# Patient Record
Sex: MALE | Race: WHITE | NOT HISPANIC OR LATINO | Employment: FULL TIME | URBAN - METROPOLITAN AREA
[De-identification: names, ages, dates, MRNs, and addresses within clinical notes are randomized per-mention and may not be internally consistent; named-entity substitution may affect disease eponyms.]

---

## 2017-10-19 ENCOUNTER — OFFICE VISIT (OUTPATIENT)
Dept: ENDOCRINOLOGY | Age: 42
End: 2017-10-19

## 2017-10-19 VITALS
OXYGEN SATURATION: 96 % | SYSTOLIC BLOOD PRESSURE: 124 MMHG | DIASTOLIC BLOOD PRESSURE: 76 MMHG | WEIGHT: 186 LBS | HEIGHT: 72 IN | BODY MASS INDEX: 25.19 KG/M2 | HEART RATE: 100 BPM

## 2017-10-19 DIAGNOSIS — Z87.442 PERSONAL HISTORY OF KIDNEY STONES: ICD-10-CM

## 2017-10-19 DIAGNOSIS — E11.9 TYPE 2 DIABETES MELLITUS WITHOUT COMPLICATION, WITH LONG-TERM CURRENT USE OF INSULIN (HCC): Primary | ICD-10-CM

## 2017-10-19 DIAGNOSIS — Z79.4 TYPE 2 DIABETES MELLITUS WITHOUT COMPLICATION, WITH LONG-TERM CURRENT USE OF INSULIN (HCC): Primary | ICD-10-CM

## 2017-10-19 DIAGNOSIS — I10 ESSENTIAL HYPERTENSION: ICD-10-CM

## 2017-10-19 DIAGNOSIS — E78.5 DYSLIPIDEMIA: ICD-10-CM

## 2017-10-19 DIAGNOSIS — E55.9 VITAMIN D DEFICIENCY: ICD-10-CM

## 2017-10-19 PROCEDURE — 99204 OFFICE O/P NEW MOD 45 MIN: CPT | Performed by: INTERNAL MEDICINE

## 2017-10-19 RX ORDER — TAMSULOSIN HYDROCHLORIDE 0.4 MG/1
CAPSULE ORAL
Refills: 11 | COMMUNITY
Start: 2017-09-27 | End: 2020-02-14

## 2017-10-19 RX ORDER — DAPAGLIFLOZIN AND METFORMIN HYDROCHLORIDE 5; 1000 MG/1; MG/1
2 TABLET, FILM COATED, EXTENDED RELEASE ORAL DAILY
Qty: 60 TABLET | Refills: 5 | Status: SHIPPED | OUTPATIENT
Start: 2017-10-19 | End: 2018-03-01 | Stop reason: SDUPTHER

## 2017-10-19 RX ORDER — HYDROCHLOROTHIAZIDE 50 MG/1
TABLET ORAL
Refills: 3 | COMMUNITY
Start: 2017-09-25 | End: 2017-10-19

## 2017-10-19 RX ORDER — INSULIN GLARGINE AND LIXISENATIDE 100; 33 U/ML; UG/ML
60 INJECTION, SOLUTION SUBCUTANEOUS DAILY
Qty: 5 PEN | Refills: 5 | Status: SHIPPED | OUTPATIENT
Start: 2017-10-19 | End: 2018-03-01 | Stop reason: SDUPTHER

## 2017-10-19 RX ORDER — ERGOCALCIFEROL 1.25 MG/1
CAPSULE ORAL
Refills: 5 | COMMUNITY
Start: 2017-09-22 | End: 2018-03-01 | Stop reason: SDUPTHER

## 2017-10-19 RX ORDER — ATORVASTATIN CALCIUM 40 MG/1
40 TABLET, FILM COATED ORAL DAILY
COMMUNITY
End: 2021-04-16 | Stop reason: SDUPTHER

## 2017-10-19 RX ORDER — INSULIN GLARGINE 100 [IU]/ML
INJECTION, SOLUTION SUBCUTANEOUS
Refills: 6 | COMMUNITY
Start: 2017-09-22 | End: 2018-03-01

## 2017-10-19 RX ORDER — LORATADINE 10 MG
TABLET ORAL
Refills: 5 | COMMUNITY
Start: 2017-10-04 | End: 2020-02-14

## 2017-10-19 RX ORDER — METFORMIN HYDROCHLORIDE 500 MG/1
TABLET, EXTENDED RELEASE ORAL
Refills: 0 | COMMUNITY
Start: 2017-09-28 | End: 2017-10-19

## 2017-10-19 RX ORDER — LOSARTAN POTASSIUM 25 MG/1
TABLET ORAL
Refills: 6 | COMMUNITY
Start: 2017-09-27 | End: 2020-02-14

## 2017-10-19 NOTE — PROGRESS NOTES
"Subjective   Gonzalo Mcduffie is a 41 y.o. male NEW PATIENT CONSULT TYPE 2 DIABETES MELLITUS LAB REVIEW  /76  Pulse 100  Ht 72\" (182.9 cm)  Wt 186 lb (84.4 kg)  SpO2 96%  BMI 25.23 kg/m2  Allergies no known allergies    Current Outpatient Prescriptions:   •  atorvastatin (LIPITOR) 40 MG tablet, Take 40 mg by mouth Daily., Disp: , Rfl:   •  [facility]S ASPIRIN ADULT LOW DOSE 81 MG chewable tablet, TAKE 1 TABLET BY MOUTH EVERY DAY, Disp: , Rfl: 5  •  hydrochlorothiazide (HYDRODIURIL) 50 MG tablet, TAKE 1 TABLET BY MOUTH EVERY DAY, Disp: , Rfl: 3  •  LANTUS SOLOSTAR 100 UNIT/ML injection pen, INJECT 28 UNITS SUBCUTANEOUSLY TWICE DAILY, Disp: , Rfl: 6  •  losartan (COZAAR) 25 MG tablet, TAKE 1 TABLET BY MOUTH DAILY, Disp: , Rfl: 6  •  metFORMIN ER (GLUCOPHAGE-XR) 500 MG 24 hr tablet, TAKE 2 TABLETS BY MOUTH TWICE A DAY, Disp: , Rfl: 0  •  tamsulosin (FLOMAX) 0.4 MG capsule 24 hr capsule, TAKE ONE CAPSULE BY MOUTH EVERY DAY, Disp: , Rfl: 11  •  vitamin D (ERGOCALCIFEROL) 78997 units capsule capsule, TAKE ONE CAPSULE BY MOUTH ONCE A WEEK, Disp: , Rfl: 5        History of Present Illness this is a 41-year-old gentleman known patient with type II diabetes for the past 10 years and the he said he started with metformin and and a few other oral agents however after she is a hemoglobin A1c did not calm down now oh he has been for some time on Lantus 28 units twice daily as well as metformin and XR 1000 mg twice daily as well.  He also has a kidney stone and lithotripsy for couple of times.  As far as his family history is concerned his father and siblings have the type II diabetes and significant diseases dyslipidemia and coronary artery bypass graft surgery.  He is not  and he has not fathered children.    The following portions of the patient's history were reviewed and updated as appropriate: allergies, current medications, past family history, past medical history, past social history, past surgical history and " problem list.    Review of Systems   Constitutional: Negative for fatigue and fever.   HENT: Negative for facial swelling, nosebleeds, trouble swallowing and voice change.    Eyes: Negative for pain and redness.   Respiratory: Negative for shortness of breath and wheezing.    Cardiovascular: Negative for palpitations and leg swelling.   Gastrointestinal: Negative for abdominal pain, diarrhea and vomiting.   Endocrine: Negative for polydipsia and polyuria.   Genitourinary: Negative for decreased urine volume and frequency.   Musculoskeletal: Negative for joint swelling and neck pain.   Skin: Negative for rash.   Allergic/Immunologic: Negative for immunocompromised state.   Neurological: Negative for seizures and facial asymmetry.   Hematological: Does not bruise/bleed easily.   Psychiatric/Behavioral: Negative for agitation and confusion.       Objective   Physical Exam   Constitutional: He is oriented to person, place, and time. He appears well-developed and well-nourished. No distress.   HENT:   Head: Normocephalic and atraumatic.   Right Ear: External ear normal.   Left Ear: External ear normal.   Nose: Nose normal.   Mouth/Throat: Oropharynx is clear and moist. No oropharyngeal exudate.   Eyes: Conjunctivae and EOM are normal. Pupils are equal, round, and reactive to light. Right eye exhibits no discharge. Left eye exhibits no discharge. No scleral icterus.   Neck: Normal range of motion. Neck supple. No JVD present. No tracheal deviation present. No thyromegaly present.   Cardiovascular: Normal rate, regular rhythm, normal heart sounds and intact distal pulses.  Exam reveals no gallop and no friction rub.    No murmur heard.  Pulmonary/Chest: Effort normal and breath sounds normal. No stridor. No respiratory distress. He has no wheezes. He has no rales. He exhibits no tenderness.   Abdominal: Soft. Bowel sounds are normal. He exhibits no distension and no mass. There is no tenderness. There is no rebound and no  guarding. No hernia.   Musculoskeletal: Normal range of motion. He exhibits no edema, tenderness or deformity.   Lymphadenopathy:     He has no cervical adenopathy.   Neurological: He is alert and oriented to person, place, and time. He has normal reflexes. He displays normal reflexes. No cranial nerve deficit. He exhibits normal muscle tone. Coordination normal.   Skin: Skin is warm and dry. No rash noted. He is not diaphoretic. No erythema. No pallor.   Read and scaly skin above his eyebrows and the bridge of his nose.   Psychiatric: He has a normal mood and affect. His behavior is normal. Judgment and thought content normal.   Nursing note and vitals reviewed.        Assessment/Plan   Diagnoses and all orders for this visit:    Type 2 diabetes mellitus without complication, with long-term current use of insulin  -     T4 & TSH ([facility])  -     TestT+TestF+SHBG  -     Uric Acid  -     Vitamin D 25 Hydroxy  -     Comprehensive Metabolic Panel  -     C-Peptide  -     Follicle Stimulating Hormone  -     Hemoglobin A1c  -     Lipid Panel  -     Luteinizing Hormone  -     Prolactin  -     PSA  -     Hemoglobin & Hematocrit, Blood  -     ACTH  -     Cortisol    Dyslipidemia  -     T4 & TSH ([facility])  -     TestT+TestF+SHBG  -     Uric Acid  -     Vitamin D 25 Hydroxy  -     Comprehensive Metabolic Panel  -     C-Peptide  -     Follicle Stimulating Hormone  -     Hemoglobin A1c  -     Lipid Panel  -     Luteinizing Hormone  -     Prolactin  -     PSA  -     Hemoglobin & Hematocrit, Blood  -     ACTH  -     Cortisol    Essential hypertension  -     T4 & TSH ([facility])  -     TestT+TestF+SHBG  -     Uric Acid  -     Vitamin D 25 Hydroxy  -     Comprehensive Metabolic Panel  -     C-Peptide  -     Follicle Stimulating Hormone  -     Hemoglobin A1c  -     Lipid Panel  -     Luteinizing Hormone  -     Prolactin  -     PSA  -     Hemoglobin & Hematocrit, Blood  -     ACTH  -     Cortisol    Vitamin D deficiency  -     T4 & TSH  ([facility])  -     TestT+TestF+SHBG  -     Uric Acid  -     Vitamin D 25 Hydroxy  -     Comprehensive Metabolic Panel  -     C-Peptide  -     Follicle Stimulating Hormone  -     Hemoglobin A1c  -     Lipid Panel  -     Luteinizing Hormone  -     Prolactin  -     PSA  -     Hemoglobin & Hematocrit, Blood  -     ACTH  -     Cortisol    Personal history of kidney stones  -     Calcium, Ionized  -     Phosphorus  -     PTH, Intact    Other orders  -     XIGDUO XR 5-1000 MG tablet sustained-release 24 hour; Take 2 tablets by mouth Daily.  -     SOLIQUA 100-33 UNT-MCG/ML solution pen-injector; Inject 60 Units under the skin Daily. 40 units Q AM, every 4 days if FBS greater than 110 as 4 units               In summary I saw and examined this 41-year-old gentleman for above-mentioned problems.  I reviewed his a primary care provider's office notes as well as laboratory evaluation particularly the one that was performed on 09/29/2017 in which it shows that he is hemoglobin A1c was 10.2.  At this time I am going to go ahead and order a more extensive laboratory evaluation and once the results come back we will go ahead and call for additional modification and calm on new medications or further evaluations.  I also switched him to antidiabetic agents that controls both fasting as well as postprandial blood glucose.  That includes Xigduo 5/1000 mg 2 tablets every morning(I ask him to drink plenty of water and also stop his hydrochlorothiazide for a few days and make sure his blood pressure is staying normal and is not dropping. ) and Soliqua to start at 40 units every morning and every 4 days if his fasting blood glucose is greater than 110 to add 4 units until he reaches 60 units every morning.  He will see Ms. Tana Gregory in 4 months or sooner if needed with laboratory evaluation prior to each office visit.

## 2017-10-25 LAB
25(OH)D3+25(OH)D2 SERPL-MCNC: 24.3 NG/ML (ref 30–100)
ACTH PLAS-MCNC: 41.4 PG/ML (ref 7.2–63.3)
ALBUMIN SERPL-MCNC: 5 G/DL (ref 3.5–5.2)
ALBUMIN/GLOB SERPL: 1.9 G/DL
ALP SERPL-CCNC: 87 U/L (ref 39–117)
ALT SERPL-CCNC: 21 U/L (ref 1–41)
AST SERPL-CCNC: 13 U/L (ref 1–40)
BILIRUB SERPL-MCNC: 0.8 MG/DL (ref 0.1–1.2)
BUN SERPL-MCNC: 14 MG/DL (ref 6–20)
BUN/CREAT SERPL: 14.4 (ref 7–25)
C PEPTIDE SERPL-MCNC: 1 NG/ML (ref 1.1–4.4)
CA-I SERPL ISE-MCNC: 5.5 MG/DL (ref 4.5–5.6)
CALCIUM SERPL-MCNC: 10.3 MG/DL (ref 8.6–10.5)
CHLORIDE SERPL-SCNC: 97 MMOL/L (ref 98–107)
CHOLEST SERPL-MCNC: 130 MG/DL (ref 0–200)
CO2 SERPL-SCNC: 28.9 MMOL/L (ref 22–29)
CORTIS SERPL-MCNC: 13.6 UG/DL
CREAT SERPL-MCNC: 0.97 MG/DL (ref 0.76–1.27)
FSH SERPL-ACNC: 2 MIU/ML (ref 1.5–12.4)
GFR SERPLBLD CREATININE-BSD FMLA CKD-EPI: 103 ML/MIN/1.73
GFR SERPLBLD CREATININE-BSD FMLA CKD-EPI: 85 ML/MIN/1.73
GLOBULIN SER CALC-MCNC: 2.6 GM/DL
GLUCOSE SERPL-MCNC: 140 MG/DL (ref 65–99)
HBA1C MFR BLD: 9.13 % (ref 4.8–5.6)
HCT VFR BLD AUTO: 52.6 % (ref 40.4–52.2)
HDLC SERPL-MCNC: 34 MG/DL (ref 40–60)
HGB BLD-MCNC: 18.2 G/DL (ref 13.7–17.6)
LDLC SERPL CALC-MCNC: 81 MG/DL (ref 0–100)
LH SERPL-ACNC: 7 MIU/ML (ref 1.7–8.6)
PHOSPHATE SERPL-MCNC: 3.1 MG/DL (ref 2.5–4.5)
POTASSIUM SERPL-SCNC: 3.8 MMOL/L (ref 3.5–5.2)
PROLACTIN SERPL-MCNC: 5.5 NG/ML (ref 4–15.2)
PROT SERPL-MCNC: 7.6 G/DL (ref 6–8.5)
PSA SERPL-MCNC: 2.43 NG/ML (ref 0–4)
PTH-INTACT SERPL-MCNC: 37 PG/ML (ref 15–65)
SHBG SERPL-SCNC: 34.3 NMOL/L (ref 16.5–55.9)
SODIUM SERPL-SCNC: 143 MMOL/L (ref 136–145)
T4 SERPL-MCNC: 8.66 MCG/DL (ref 4.5–11.7)
TESTOST FREE SERPL-MCNC: 11.1 PG/ML (ref 6.8–21.5)
TESTOST SERPL-MCNC: 377 NG/DL (ref 264–916)
TRIGL SERPL-MCNC: 73 MG/DL (ref 0–150)
TSH SERPL DL<=0.005 MIU/L-ACNC: 0.74 MIU/ML (ref 0.27–4.2)
URATE SERPL-MCNC: 6 MG/DL (ref 3.4–7)
VLDLC SERPL CALC-MCNC: 14.6 MG/DL (ref 5–40)

## 2017-12-15 ENCOUNTER — TELEPHONE (OUTPATIENT)
Dept: ENDOCRINOLOGY | Age: 42
End: 2017-12-15

## 2017-12-15 NOTE — TELEPHONE ENCOUNTER
----- Message from Jess Barroso MD sent at 12/15/2017  2:47 PM EST -----  I do not have any problem with that.  Go ahead and start taken it.  ----- Message -----     From: Sujey Rodriguez MA     Sent: 12/14/2017  11:02 AM       To: Jess Barroso MD    Patient was instructed on last office visit to stop HCTZ. Since then he has been seen by his Urologist who told him to restart the medication and he wants to know if there is any issue with doing this?    Left patient a detailed voicemail.

## 2018-02-15 ENCOUNTER — LAB (OUTPATIENT)
Dept: ENDOCRINOLOGY | Age: 43
End: 2018-02-15

## 2018-02-15 DIAGNOSIS — Z79.4 TYPE 2 DIABETES MELLITUS WITHOUT COMPLICATION, WITH LONG-TERM CURRENT USE OF INSULIN (HCC): Primary | ICD-10-CM

## 2018-02-15 DIAGNOSIS — Z79.4 TYPE 2 DIABETES MELLITUS WITHOUT COMPLICATION, WITH LONG-TERM CURRENT USE OF INSULIN (HCC): ICD-10-CM

## 2018-02-15 DIAGNOSIS — E11.9 TYPE 2 DIABETES MELLITUS WITHOUT COMPLICATION, WITH LONG-TERM CURRENT USE OF INSULIN (HCC): Primary | ICD-10-CM

## 2018-02-15 DIAGNOSIS — E55.9 VITAMIN D DEFICIENCY: ICD-10-CM

## 2018-02-15 DIAGNOSIS — E11.9 TYPE 2 DIABETES MELLITUS WITHOUT COMPLICATION, WITH LONG-TERM CURRENT USE OF INSULIN (HCC): ICD-10-CM

## 2018-02-16 LAB
25(OH)D3+25(OH)D2 SERPL-MCNC: 52.2 NG/ML (ref 30–100)
ALBUMIN SERPL-MCNC: 4.3 G/DL (ref 3.5–5.2)
ALBUMIN/GLOB SERPL: 1.7 G/DL
ALP SERPL-CCNC: 78 U/L (ref 39–117)
ALT SERPL-CCNC: 13 U/L (ref 1–41)
AST SERPL-CCNC: 12 U/L (ref 1–40)
BILIRUB SERPL-MCNC: 0.6 MG/DL (ref 0.1–1.2)
BUN SERPL-MCNC: 20 MG/DL (ref 6–20)
BUN/CREAT SERPL: 22.2 (ref 7–25)
C PEPTIDE SERPL-MCNC: 2 NG/ML (ref 1.1–4.4)
CALCIUM SERPL-MCNC: 9.8 MG/DL (ref 8.6–10.5)
CHLORIDE SERPL-SCNC: 98 MMOL/L (ref 98–107)
CHOLEST SERPL-MCNC: 127 MG/DL (ref 0–200)
CO2 SERPL-SCNC: 30.5 MMOL/L (ref 22–29)
CREAT SERPL-MCNC: 0.9 MG/DL (ref 0.76–1.27)
GFR SERPLBLD CREATININE-BSD FMLA CKD-EPI: 112 ML/MIN/1.73
GFR SERPLBLD CREATININE-BSD FMLA CKD-EPI: 92 ML/MIN/1.73
GLOBULIN SER CALC-MCNC: 2.6 GM/DL
GLUCOSE SERPL-MCNC: 102 MG/DL (ref 65–99)
HBA1C MFR BLD: 6.59 % (ref 4.8–5.6)
HDLC SERPL-MCNC: 27 MG/DL (ref 40–60)
INTERPRETATION: NORMAL
LDLC SERPL CALC-MCNC: 80 MG/DL (ref 0–100)
Lab: NORMAL
MICROALBUMIN UR-MCNC: 49.3 UG/ML
POTASSIUM SERPL-SCNC: 4.2 MMOL/L (ref 3.5–5.2)
PROT SERPL-MCNC: 6.9 G/DL (ref 6–8.5)
SODIUM SERPL-SCNC: 144 MMOL/L (ref 136–145)
TRIGL SERPL-MCNC: 99 MG/DL (ref 0–150)
VLDLC SERPL CALC-MCNC: 19.8 MG/DL (ref 5–40)

## 2018-03-01 ENCOUNTER — OFFICE VISIT (OUTPATIENT)
Dept: ENDOCRINOLOGY | Age: 43
End: 2018-03-01

## 2018-03-01 VITALS
HEIGHT: 72 IN | SYSTOLIC BLOOD PRESSURE: 110 MMHG | WEIGHT: 181 LBS | BODY MASS INDEX: 24.52 KG/M2 | DIASTOLIC BLOOD PRESSURE: 70 MMHG

## 2018-03-01 DIAGNOSIS — E78.5 DYSLIPIDEMIA: ICD-10-CM

## 2018-03-01 DIAGNOSIS — E55.9 VITAMIN D DEFICIENCY: ICD-10-CM

## 2018-03-01 DIAGNOSIS — I10 ESSENTIAL HYPERTENSION: ICD-10-CM

## 2018-03-01 DIAGNOSIS — E11.9 TYPE 2 DIABETES MELLITUS WITHOUT COMPLICATION, WITH LONG-TERM CURRENT USE OF INSULIN (HCC): Primary | ICD-10-CM

## 2018-03-01 DIAGNOSIS — Z79.4 TYPE 2 DIABETES MELLITUS WITHOUT COMPLICATION, WITH LONG-TERM CURRENT USE OF INSULIN (HCC): Primary | ICD-10-CM

## 2018-03-01 PROCEDURE — 99214 OFFICE O/P EST MOD 30 MIN: CPT | Performed by: NURSE PRACTITIONER

## 2018-03-01 RX ORDER — INSULIN GLARGINE AND LIXISENATIDE 100; 33 U/ML; UG/ML
60 INJECTION, SOLUTION SUBCUTANEOUS DAILY
Qty: 5 PEN | Refills: 5 | Status: SHIPPED | OUTPATIENT
Start: 2018-03-01 | End: 2018-09-21 | Stop reason: SDUPTHER

## 2018-03-01 RX ORDER — ERGOCALCIFEROL 1.25 MG/1
50000 CAPSULE ORAL
Qty: 4 CAPSULE | Refills: 5 | Status: SHIPPED | OUTPATIENT
Start: 2018-03-01 | End: 2018-09-21 | Stop reason: SDUPTHER

## 2018-03-01 RX ORDER — DAPAGLIFLOZIN AND METFORMIN HYDROCHLORIDE 5; 1000 MG/1; MG/1
2 TABLET, FILM COATED, EXTENDED RELEASE ORAL DAILY
Qty: 60 TABLET | Refills: 5 | Status: SHIPPED | OUTPATIENT
Start: 2018-03-01 | End: 2018-09-21 | Stop reason: SDUPTHER

## 2018-03-01 RX ORDER — CICLOPIROX 7.7 MG/G
GEL TOPICAL
Refills: 3 | COMMUNITY
Start: 2018-01-30 | End: 2018-09-21

## 2018-03-01 RX ORDER — HYDROCHLOROTHIAZIDE 25 MG/1
25 TABLET ORAL DAILY
COMMUNITY
Start: 2018-02-05 | End: 2021-04-20

## 2018-03-01 RX ORDER — PEN NEEDLE, DIABETIC 31 GX5/16"
NEEDLE, DISPOSABLE MISCELLANEOUS
Refills: 1 | COMMUNITY
Start: 2018-01-15 | End: 2018-03-01 | Stop reason: SDUPTHER

## 2018-03-01 RX ORDER — PEN NEEDLE, DIABETIC 31 GX5/16"
NEEDLE, DISPOSABLE MISCELLANEOUS
Qty: 100 EACH | Refills: 1 | Status: SHIPPED | OUTPATIENT
Start: 2018-03-01 | End: 2019-04-08 | Stop reason: SDUPTHER

## 2018-03-01 NOTE — PROGRESS NOTES
"Subjective   Gonzalo Mcduffie is a 42 y.o. male is here today for follow-up.  Chief Complaint   Patient presents with   • Diabetes     recent labs, pt tests BG occasionally, pt did not bring meter   • Hypertension   • Hyperlipidemia   • Vitamin D Deficiency     /70  Ht 182.9 cm (72\")  Wt 82.1 kg (181 lb)  BMI 24.55 kg/m2  Current Outpatient Prescriptions on File Prior to Visit   Medication Sig   • atorvastatin (LIPITOR) 40 MG tablet Take 40 mg by mouth Daily.   • [facility] ASPIRIN ADULT LOW DOSE 81 MG chewable tablet TAKE 1 TABLET BY MOUTH EVERY DAY   • losartan (COZAAR) 25 MG tablet TAKE 1 TABLET BY MOUTH DAILY   • SOLIQUA 100-33 UNT-MCG/ML solution pen-injector Inject 60 Units under the skin Daily. 40 units Q AM, every 4 days if FBS greater than 110 as 4 units   • tamsulosin (FLOMAX) 0.4 MG capsule 24 hr capsule TAKE ONE CAPSULE BY MOUTH EVERY DAY   • vitamin D (ERGOCALCIFEROL) 60628 units capsule capsule TAKE ONE CAPSULE BY MOUTH ONCE A WEEK   • XIGDUO XR 5-1000 MG tablet sustained-release 24 hour Take 2 tablets by mouth Daily.   • [DISCONTINUED] LANTUS SOLOSTAR 100 UNIT/ML injection pen INJECT 28 UNITS SUBCUTANEOUSLY TWICE DAILY     No current facility-administered medications on file prior to visit.      Family History   Problem Relation Age of Onset   • Hypertension Mother    • Heart disease Mother    • Diabetes Mother    • Diabetes Father    • JOSE disease Father    • Heart disease Father    • Diabetes Sister    • Cancer Brother      Social History   Substance Use Topics   • Smoking status: None   • Smokeless tobacco: None   • Alcohol use None     No Known Allergies      History of Present Illness  Encounter Diagnoses   Name Primary?   • Essential hypertension    • Vitamin D deficiency    • Type 2 diabetes mellitus without complication, with long-term current use of insulin Yes   • Dyslipidemia      This is a 42-year-old male patient here today for routine follow-up visit.  He is being seen for the " above-mentioned problems.  He had recent labs which were reviewed and he was provided a copy.  He does check his blood sugars occasionally however he did not bring his blood glucose meter to today's visit.  He denies any hypoglycemic events.  He cannot 20 when his blood sugars are running at home.  He is taking his medications as prescribed.  He has lost approximately 5 pounds according to our scales.  He has no complaints at today's visit.  The following portions of the patient's history were reviewed and updated as appropriate: allergies, current medications, past family history, past medical history, past social history, past surgical history and problem list.    Review of Systems   Constitutional: Negative for fatigue.   HENT: Negative for trouble swallowing.    Eyes: Negative for visual disturbance.   Respiratory: Negative for shortness of breath.    Cardiovascular: Negative for leg swelling.   Endocrine: Negative for polyuria.   Skin: Negative for wound.   Neurological: Negative for numbness.       Objective   Physical Exam   Constitutional: He is oriented to person, place, and time. He appears well-developed and well-nourished. No distress.   HENT:   Head: Normocephalic and atraumatic.   Right Ear: External ear normal.   Left Ear: External ear normal.   Nose: Nose normal.   Mouth/Throat: Oropharynx is clear and moist. No oropharyngeal exudate.   Eyes: Conjunctivae and EOM are normal. Pupils are equal, round, and reactive to light. Right eye exhibits no discharge. Left eye exhibits no discharge. No scleral icterus.   Neck: Normal range of motion. Neck supple. No JVD present. No tracheal deviation present. No thyromegaly present.   Cardiovascular: Normal rate, regular rhythm, normal heart sounds and intact distal pulses.  Exam reveals no gallop and no friction rub.    No murmur heard.  Pulmonary/Chest: Effort normal and breath sounds normal. No stridor. No respiratory distress. He has no wheezes. He has no  rales. He exhibits no tenderness.   Abdominal: Soft. Bowel sounds are normal. He exhibits no distension and no mass. There is no tenderness. There is no rebound and no guarding. No hernia.   Musculoskeletal: Normal range of motion. He exhibits no edema, tenderness or deformity.   Lymphadenopathy:     He has no cervical adenopathy.   Neurological: He is alert and oriented to person, place, and time. He has normal reflexes. He displays normal reflexes. No cranial nerve deficit. He exhibits normal muscle tone. Coordination normal.   Skin: Skin is warm and dry. No rash noted. He is not diaphoretic. No erythema. No pallor.   Read and scaly skin above his eyebrows and the bridge of his nose.   Psychiatric: He has a normal mood and affect. His behavior is normal. Judgment and thought content normal.   Nursing note and vitals reviewed.    Results for orders placed or performed in visit on 02/15/18   Comprehensive Metabolic Panel   Result Value Ref Range    Glucose 102 (H) 65 - 99 mg/dL    BUN 20 6 - 20 mg/dL    Creatinine 0.90 0.76 - 1.27 mg/dL    eGFR Non African Am 92 >60 mL/min/1.73    eGFR African Am 112 >60 mL/min/1.73    BUN/Creatinine Ratio 22.2 7.0 - 25.0    Sodium 144 136 - 145 mmol/L    Potassium 4.2 3.5 - 5.2 mmol/L    Chloride 98 98 - 107 mmol/L    Total CO2 30.5 (H) 22.0 - 29.0 mmol/L    Calcium 9.8 8.6 - 10.5 mg/dL    Total Protein 6.9 6.0 - 8.5 g/dL    Albumin 4.30 3.50 - 5.20 g/dL    Globulin 2.6 gm/dL    A/G Ratio 1.7 g/dL    Total Bilirubin 0.6 0.1 - 1.2 mg/dL    Alkaline Phosphatase 78 39 - 117 U/L    AST (SGOT) 12 1 - 40 U/L    ALT (SGPT) 13 1 - 41 U/L   C-Peptide   Result Value Ref Range    C-Peptide 2.0 1.1 - 4.4 ng/mL   Hemoglobin A1c   Result Value Ref Range    Hemoglobin A1C 6.59 (H) 4.80 - 5.60 %   Lipid Panel   Result Value Ref Range    Total Cholesterol 127 0 - 200 mg/dL    Triglycerides 99 0 - 150 mg/dL    HDL Cholesterol 27 (L) 40 - 60 mg/dL    VLDL Cholesterol 19.8 5 - 40 mg/dL    LDL  Cholesterol  80 0 - 100 mg/dL   Vitamin D 25 Hydroxy   Result Value Ref Range    25 Hydroxy, Vitamin D 52.2 30.0 - 100.0 ng/mL   MicroAlbumin, Urine, Random   Result Value Ref Range    Microalbumin, Urine 49.3 Not Estab. ug/mL   Cardiovascular Risk Assessment   Result Value Ref Range    Interpretation Note    Diabetes Patient Education   Result Value Ref Range    PDF Image Not applicable          Assessment/Plan   Problems Addressed this Visit        Cardiovascular and Mediastinum    Essential hypertension    Relevant Medications    hydrochlorothiazide (HYDRODIURIL) 25 MG tablet       Digestive    Vitamin D deficiency       Endocrine    Type 2 diabetes mellitus without complication, with long-term current use of insulin - Primary       Other    Dyslipidemia        In summary, patient was seen and examined.  Metabolically he is stable.  His medications were reviewed and he is doing well on all his medications.  He denies having any questions regarding his medications.  He has lost 5 pounds since his last visit.  His blood pressure is in good range.  His hemoglobin A1c has improved significantly on his current medications.  He was encouraged to monitor his blood sugars should he have any hypoglycemic symptoms and to contact the office if anything changes.  He will follow-up with Dr. Barroso in 6 months with labs prior.  Vitamin D is in good range with therapy.

## 2018-08-27 DIAGNOSIS — E78.5 DYSLIPIDEMIA: ICD-10-CM

## 2018-08-27 DIAGNOSIS — Z79.4 TYPE 2 DIABETES MELLITUS WITHOUT COMPLICATION, WITH LONG-TERM CURRENT USE OF INSULIN (HCC): ICD-10-CM

## 2018-08-27 DIAGNOSIS — E55.9 VITAMIN D DEFICIENCY: Primary | ICD-10-CM

## 2018-08-27 DIAGNOSIS — E11.9 TYPE 2 DIABETES MELLITUS WITHOUT COMPLICATION, WITH LONG-TERM CURRENT USE OF INSULIN (HCC): ICD-10-CM

## 2018-09-07 ENCOUNTER — LAB (OUTPATIENT)
Dept: ENDOCRINOLOGY | Age: 43
End: 2018-09-07

## 2018-09-07 DIAGNOSIS — E55.9 VITAMIN D DEFICIENCY: ICD-10-CM

## 2018-09-07 DIAGNOSIS — E78.5 DYSLIPIDEMIA: ICD-10-CM

## 2018-09-07 DIAGNOSIS — Z79.4 TYPE 2 DIABETES MELLITUS WITHOUT COMPLICATION, WITH LONG-TERM CURRENT USE OF INSULIN (HCC): ICD-10-CM

## 2018-09-07 DIAGNOSIS — E11.9 TYPE 2 DIABETES MELLITUS WITHOUT COMPLICATION, WITH LONG-TERM CURRENT USE OF INSULIN (HCC): ICD-10-CM

## 2018-09-09 LAB
25(OH)D3+25(OH)D2 SERPL-MCNC: 28.9 NG/ML (ref 30–100)
ALBUMIN SERPL-MCNC: 4.7 G/DL (ref 3.5–5.2)
ALBUMIN/GLOB SERPL: 1.5 G/DL
ALP SERPL-CCNC: 95 U/L (ref 39–117)
ALT SERPL-CCNC: 22 U/L (ref 1–41)
AST SERPL-CCNC: 10 U/L (ref 1–40)
BILIRUB SERPL-MCNC: 0.8 MG/DL (ref 0.1–1.2)
BUN SERPL-MCNC: 16 MG/DL (ref 6–20)
BUN/CREAT SERPL: 16.8 (ref 7–25)
C PEPTIDE SERPL-MCNC: 3.4 NG/ML (ref 1.1–4.4)
CALCIUM SERPL-MCNC: 10.4 MG/DL (ref 8.6–10.5)
CHLORIDE SERPL-SCNC: 99 MMOL/L (ref 98–107)
CHOLEST SERPL-MCNC: 193 MG/DL (ref 0–200)
CO2 SERPL-SCNC: 29.5 MMOL/L (ref 22–29)
CREAT SERPL-MCNC: 0.95 MG/DL (ref 0.76–1.27)
GLOBULIN SER CALC-MCNC: 3.2 GM/DL
GLUCOSE SERPL-MCNC: 156 MG/DL (ref 65–99)
HBA1C MFR BLD: 8 % (ref 4.8–5.6)
HDLC SERPL-MCNC: 31 MG/DL (ref 40–60)
INTERPRETATION: NORMAL
LDLC SERPL CALC-MCNC: 139 MG/DL (ref 0–100)
Lab: NORMAL
POTASSIUM SERPL-SCNC: 4.3 MMOL/L (ref 3.5–5.2)
PROT SERPL-MCNC: 7.9 G/DL (ref 6–8.5)
SODIUM SERPL-SCNC: 143 MMOL/L (ref 136–145)
T4 SERPL-MCNC: 9.07 MCG/DL (ref 4.5–11.7)
TRIGL SERPL-MCNC: 117 MG/DL (ref 0–150)
TSH SERPL DL<=0.005 MIU/L-ACNC: 0.84 MIU/ML (ref 0.27–4.2)
UNABLE TO VOID: NORMAL
URATE SERPL-MCNC: 6.3 MG/DL (ref 3.4–7)
VLDLC SERPL CALC-MCNC: 23.4 MG/DL (ref 5–40)

## 2018-09-21 ENCOUNTER — OFFICE VISIT (OUTPATIENT)
Dept: ENDOCRINOLOGY | Age: 43
End: 2018-09-21

## 2018-09-21 VITALS
SYSTOLIC BLOOD PRESSURE: 112 MMHG | DIASTOLIC BLOOD PRESSURE: 70 MMHG | BODY MASS INDEX: 25.38 KG/M2 | WEIGHT: 187.4 LBS | HEIGHT: 72 IN | RESPIRATION RATE: 16 BRPM

## 2018-09-21 DIAGNOSIS — E55.9 VITAMIN D DEFICIENCY: ICD-10-CM

## 2018-09-21 DIAGNOSIS — Z79.4 TYPE 2 DIABETES MELLITUS WITHOUT COMPLICATION, WITH LONG-TERM CURRENT USE OF INSULIN (HCC): Primary | ICD-10-CM

## 2018-09-21 DIAGNOSIS — I10 ESSENTIAL HYPERTENSION: ICD-10-CM

## 2018-09-21 DIAGNOSIS — E78.5 DYSLIPIDEMIA: ICD-10-CM

## 2018-09-21 DIAGNOSIS — E11.9 TYPE 2 DIABETES MELLITUS WITHOUT COMPLICATION, WITH LONG-TERM CURRENT USE OF INSULIN (HCC): Primary | ICD-10-CM

## 2018-09-21 PROCEDURE — 99214 OFFICE O/P EST MOD 30 MIN: CPT | Performed by: INTERNAL MEDICINE

## 2018-09-21 RX ORDER — ERGOCALCIFEROL 1.25 MG/1
50000 CAPSULE ORAL 2 TIMES WEEKLY
Qty: 26 CAPSULE | Refills: 3 | Status: SHIPPED | OUTPATIENT
Start: 2018-09-24 | End: 2019-09-17 | Stop reason: SDUPTHER

## 2018-09-21 RX ORDER — DAPAGLIFLOZIN AND METFORMIN HYDROCHLORIDE 5; 1000 MG/1; MG/1
2 TABLET, FILM COATED, EXTENDED RELEASE ORAL DAILY
Qty: 60 TABLET | Refills: 5 | Status: SHIPPED | OUTPATIENT
Start: 2018-09-21 | End: 2019-06-24 | Stop reason: SDUPTHER

## 2018-09-21 RX ORDER — INSULIN GLARGINE AND LIXISENATIDE 100; 33 U/ML; UG/ML
60 INJECTION, SOLUTION SUBCUTANEOUS DAILY
Qty: 5 PEN | Refills: 5 | Status: SHIPPED | OUTPATIENT
Start: 2018-09-21 | End: 2019-05-16 | Stop reason: SDUPTHER

## 2018-09-21 NOTE — PROGRESS NOTES
"Subjective   Gonzalo Mcduffie is a 42 y.o. male seen for follow up for Dm2, hyperlipidemia, HTN, lab review. Patient is not checking BG. He denies any problems or concerns.     History of Present Illness this is a 42-year-old gentleman known patient with type II diabetes hypertension and dyslipidemia as well as vitamin D deficiency.  Over the course of last 6 months he has had no significant health problems for which to go to the emergency room or hospital.    /70   Resp 16   Ht 182.9 cm (72\")   Wt 85 kg (187 lb 6.4 oz)   BMI 25.42 kg/m²      No Known Allergies    Current Outpatient Prescriptions:   •  atorvastatin (LIPITOR) 40 MG tablet, Take 40 mg by mouth Daily., Disp: , Rfl:   •  B-D ULTRAFINE III SHORT PEN 31G X 8 MM misc, Use to inject insulin 1x daily, Disp: 100 each, Rfl: 1  •  [facility]S ASPIRIN ADULT LOW DOSE 81 MG chewable tablet, TAKE 1 TABLET BY MOUTH EVERY DAY, Disp: , Rfl: 5  •  hydrochlorothiazide (HYDRODIURIL) 25 MG tablet, , Disp: , Rfl:   •  losartan (COZAAR) 25 MG tablet, TAKE 1 TABLET BY MOUTH DAILY, Disp: , Rfl: 6  •  ONE TOUCH ULTRA TEST test strip, Check blood sugars PRN, Disp: 50 each, Rfl: 4  •  SOLIQUA 100-33 UNT-MCG/ML solution pen-injector, Inject 60 Units under the skin Daily. 40 units Q AM, every 4 days if FBS greater than 110 as 4 units, Disp: 5 pen, Rfl: 5  •  tamsulosin (FLOMAX) 0.4 MG capsule 24 hr capsule, TAKE ONE CAPSULE BY MOUTH EVERY DAY, Disp: , Rfl: 11  •  vitamin D (ERGOCALCIFEROL) 23870 units capsule capsule, Take 1 capsule by mouth Every 7 (Seven) Days., Disp: 4 capsule, Rfl: 5  •  XIGDUO XR 5-1000 MG tablet, Take 2 tablets by mouth Daily., Disp: 60 tablet, Rfl: 5    The following portions of the patient's history were reviewed and updated as appropriate: allergies, current medications, past family history, past medical history, past social history, past surgical history and problem list.    Review of Systems   Constitutional: Negative.    HENT: Negative.    Eyes: " Negative.    Respiratory: Negative.    Cardiovascular: Negative.    Gastrointestinal: Negative.    Endocrine: Negative.    Genitourinary: Negative.    Musculoskeletal: Negative.    Skin: Negative.    Allergic/Immunologic: Negative.    Neurological: Negative.    Hematological: Negative.    Psychiatric/Behavioral: Negative.        Objective   Physical Exam   Constitutional: He is oriented to person, place, and time. He appears well-developed and well-nourished. No distress.   HENT:   Head: Normocephalic and atraumatic.   Right Ear: External ear normal.   Left Ear: External ear normal.   Nose: Nose normal.   Mouth/Throat: Oropharynx is clear and moist. No oropharyngeal exudate.   Eyes: Pupils are equal, round, and reactive to light. Conjunctivae and EOM are normal. Right eye exhibits no discharge. Left eye exhibits no discharge. No scleral icterus.   Neck: Normal range of motion. Neck supple. No JVD present. No tracheal deviation present. No thyromegaly present.   Cardiovascular: Normal rate, regular rhythm, normal heart sounds and intact distal pulses.  Exam reveals no gallop and no friction rub.    No murmur heard.  Pulmonary/Chest: Effort normal and breath sounds normal. No stridor. No respiratory distress. He has no wheezes. He has no rales. He exhibits no tenderness.   Abdominal: Soft. Bowel sounds are normal. He exhibits no distension and no mass. There is no tenderness. There is no rebound and no guarding. No hernia.   Musculoskeletal: Normal range of motion. He exhibits no edema, tenderness or deformity.   Lymphadenopathy:     He has no cervical adenopathy.   Neurological: He is alert and oriented to person, place, and time. He has normal reflexes. He displays normal reflexes. No cranial nerve deficit or sensory deficit. He exhibits normal muscle tone. Coordination normal.   Skin: Skin is warm and dry. No rash noted. He is not diaphoretic. No erythema. No pallor.   Read and scaly skin above his eyebrows and the  bridge of his nose.   Psychiatric: He has a normal mood and affect. His behavior is normal. Judgment and thought content normal.   Nursing note and vitals reviewed.       Results for orders placed or performed in visit on 09/07/18   Comprehensive Metabolic Panel   Result Value Ref Range    Glucose 156 (H) 65 - 99 mg/dL    BUN 16 6 - 20 mg/dL    Creatinine 0.95 0.76 - 1.27 mg/dL    eGFR Non African Am 87 >60 mL/min/1.73    eGFR African Am 105 >60 mL/min/1.73    BUN/Creatinine Ratio 16.8 7.0 - 25.0    Sodium 143 136 - 145 mmol/L    Potassium 4.3 3.5 - 5.2 mmol/L    Chloride 99 98 - 107 mmol/L    Total CO2 29.5 (H) 22.0 - 29.0 mmol/L    Calcium 10.4 8.6 - 10.5 mg/dL    Total Protein 7.9 6.0 - 8.5 g/dL    Albumin 4.70 3.50 - 5.20 g/dL    Globulin 3.2 gm/dL    A/G Ratio 1.5 g/dL    Total Bilirubin 0.8 0.1 - 1.2 mg/dL    Alkaline Phosphatase 95 39 - 117 U/L    AST (SGOT) 10 1 - 40 U/L    ALT (SGPT) 22 1 - 41 U/L   C-Peptide   Result Value Ref Range    C-Peptide 3.4 1.1 - 4.4 ng/mL   Hemoglobin A1c   Result Value Ref Range    Hemoglobin A1C 8.00 (H) 4.80 - 5.60 %   Lipid Panel   Result Value Ref Range    Total Cholesterol 193 0 - 200 mg/dL    Triglycerides 117 0 - 150 mg/dL    HDL Cholesterol 31 (L) 40 - 60 mg/dL    VLDL Cholesterol 23.4 5 - 40 mg/dL    LDL Cholesterol  139 (H) 0 - 100 mg/dL   Uric Acid   Result Value Ref Range    Uric Acid 6.3 3.4 - 7.0 mg/dL   Vitamin D 25 Hydroxy   Result Value Ref Range    25 Hydroxy, Vitamin D 28.9 (L) 30.0 - 100.0 ng/ml   T4 & TSH ([facility])   Result Value Ref Range    TSH 0.840 0.270 - 4.200 mIU/mL    T4, Total 9.07 4.50 - 11.70 mcg/dL   Unable To Void   Result Value Ref Range    Unable to Void Comment    Cardiovascular Risk Assessment   Result Value Ref Range    Interpretation Note    Diabetes Patient Education   Result Value Ref Range    PDF Image Not applicable          Assessment/Plan   Diagnoses and all orders for this visit:    Type 2 diabetes mellitus without complication,  with long-term current use of insulin (CMS/[facility])  -     T4 & TSH ([facility]); Future  -     Uric Acid; Future  -     Vitamin D 25 Hydroxy; Future  -     Comprehensive Metabolic Panel; Future  -     C-Peptide; Future  -     Hemoglobin A1c; Future  -     Lipid Panel; Future  -     MicroAlbumin, Urine, Random - Urine, Clean Catch; Future    Essential hypertension  -     T4 & TSH ([facility]); Future  -     Uric Acid; Future  -     Vitamin D 25 Hydroxy; Future  -     Comprehensive Metabolic Panel; Future  -     C-Peptide; Future  -     Hemoglobin A1c; Future  -     Lipid Panel; Future  -     MicroAlbumin, Urine, Random - Urine, Clean Catch; Future    Vitamin D deficiency  -     T4 & TSH ([facility]); Future  -     Uric Acid; Future  -     Vitamin D 25 Hydroxy; Future  -     Comprehensive Metabolic Panel; Future  -     C-Peptide; Future  -     Hemoglobin A1c; Future  -     Lipid Panel; Future  -     MicroAlbumin, Urine, Random - Urine, Clean Catch; Future    Dyslipidemia  -     T4 & TSH ([facility]); Future  -     Uric Acid; Future  -     Vitamin D 25 Hydroxy; Future  -     Comprehensive Metabolic Panel; Future  -     C-Peptide; Future  -     Hemoglobin A1c; Future  -     Lipid Panel; Future  -     MicroAlbumin, Urine, Random - Urine, Clean Catch; Future    Other orders  -     XIGDUO XR 5-1000 MG tablet; Take 2 tablets by mouth Daily.  -     vitamin D (ERGOCALCIFEROL) 94739 units capsule capsule; Take 1 capsule by mouth 2 (Two) Times a Week.  -     SOLIQUA 100-33 UNT-MCG/ML solution pen-injector; Inject 60 Units under the skin into the appropriate area as directed Daily. 40 units Q AM, every 4 days if FBS greater than 110 as 4 units             in summary I saw and examined this 42-year-old gentleman for above-mentioned problems.  I reviewed his laboratory evaluation of 09/07/2018 and provided him with a hard copy of it.  His hemoglobin A1c is high at 8.0 and his vitamin D level is low at 28.9 and his LDL is elevated at 139.  We  talked about him being as careful as he was before his last office visit and we will continue his medications.  He will see Ms. Tana Gregory in 6 months or sooner if needed with laboratory evaluation prior to each office visit.

## 2018-12-04 ENCOUNTER — HOSPITAL ENCOUNTER (OUTPATIENT)
Dept: GENERAL RADIOLOGY | Facility: HOSPITAL | Age: 43
Discharge: HOME | End: 2018-12-04
Attending: UROLOGY | Admitting: UROLOGY

## 2018-12-04 DIAGNOSIS — Z87.442 HISTORY OF URINARY CALCULI: ICD-10-CM

## 2018-12-04 PROCEDURE — 74018 RADEX ABDOMEN 1 VIEW: CPT

## 2019-02-14 ENCOUNTER — HOSPITAL ENCOUNTER (OUTPATIENT)
Dept: OTHER | Facility: HOSPITAL | Age: 44
Setting detail: SPECIMEN
Discharge: HOME | End: 2019-02-14
Attending: UROLOGY | Admitting: UROLOGY

## 2019-02-26 DIAGNOSIS — E55.9 VITAMIN D DEFICIENCY: Primary | ICD-10-CM

## 2019-02-26 DIAGNOSIS — E78.5 DYSLIPIDEMIA: ICD-10-CM

## 2019-02-26 DIAGNOSIS — E11.9 TYPE 2 DIABETES MELLITUS WITHOUT COMPLICATION, WITH LONG-TERM CURRENT USE OF INSULIN (HCC): ICD-10-CM

## 2019-02-26 DIAGNOSIS — Z79.4 TYPE 2 DIABETES MELLITUS WITHOUT COMPLICATION, WITH LONG-TERM CURRENT USE OF INSULIN (HCC): ICD-10-CM

## 2019-03-08 ENCOUNTER — LAB (OUTPATIENT)
Dept: ENDOCRINOLOGY | Age: 44
End: 2019-03-08

## 2019-03-08 DIAGNOSIS — E78.5 DYSLIPIDEMIA: ICD-10-CM

## 2019-03-08 DIAGNOSIS — E55.9 VITAMIN D DEFICIENCY: ICD-10-CM

## 2019-03-08 DIAGNOSIS — Z79.4 TYPE 2 DIABETES MELLITUS WITHOUT COMPLICATION, WITH LONG-TERM CURRENT USE OF INSULIN (HCC): ICD-10-CM

## 2019-03-08 DIAGNOSIS — E11.9 TYPE 2 DIABETES MELLITUS WITHOUT COMPLICATION, WITH LONG-TERM CURRENT USE OF INSULIN (HCC): ICD-10-CM

## 2019-03-09 LAB
25(OH)D3+25(OH)D2 SERPL-MCNC: 37 NG/ML (ref 30–100)
ALBUMIN SERPL-MCNC: 4.6 G/DL (ref 3.5–5.2)
ALBUMIN/GLOB SERPL: 1.7 G/DL
ALP SERPL-CCNC: 88 U/L (ref 39–117)
ALT SERPL-CCNC: 20 U/L (ref 1–41)
AST SERPL-CCNC: 11 U/L (ref 1–40)
BILIRUB SERPL-MCNC: 0.6 MG/DL (ref 0.1–1.2)
BUN SERPL-MCNC: 15 MG/DL (ref 6–20)
BUN/CREAT SERPL: 14.7 (ref 7–25)
C PEPTIDE SERPL-MCNC: 3.7 NG/ML (ref 1.1–4.4)
CALCIUM SERPL-MCNC: 10.6 MG/DL (ref 8.6–10.5)
CHLORIDE SERPL-SCNC: 95 MMOL/L (ref 98–107)
CHOLEST SERPL-MCNC: 156 MG/DL (ref 0–200)
CO2 SERPL-SCNC: 28 MMOL/L (ref 22–29)
CREAT SERPL-MCNC: 1.02 MG/DL (ref 0.76–1.27)
GLOBULIN SER CALC-MCNC: 2.7 GM/DL
GLUCOSE SERPL-MCNC: 154 MG/DL (ref 65–99)
HBA1C MFR BLD: 7.7 % (ref 4.8–5.6)
HDLC SERPL-MCNC: 32 MG/DL (ref 40–60)
INTERPRETATION: NORMAL
LDLC SERPL CALC-MCNC: 103 MG/DL (ref 0–100)
Lab: NORMAL
POTASSIUM SERPL-SCNC: 4.3 MMOL/L (ref 3.5–5.2)
PROT SERPL-MCNC: 7.3 G/DL (ref 6–8.5)
SODIUM SERPL-SCNC: 139 MMOL/L (ref 136–145)
TRIGL SERPL-MCNC: 107 MG/DL (ref 0–150)
VLDLC SERPL CALC-MCNC: 21.4 MG/DL (ref 5–40)

## 2019-03-12 ENCOUNTER — RESULTS ENCOUNTER (OUTPATIENT)
Dept: ENDOCRINOLOGY | Age: 44
End: 2019-03-12

## 2019-03-12 DIAGNOSIS — I10 ESSENTIAL HYPERTENSION: ICD-10-CM

## 2019-03-12 DIAGNOSIS — E55.9 VITAMIN D DEFICIENCY: ICD-10-CM

## 2019-03-12 DIAGNOSIS — Z79.4 TYPE 2 DIABETES MELLITUS WITHOUT COMPLICATION, WITH LONG-TERM CURRENT USE OF INSULIN (HCC): ICD-10-CM

## 2019-03-12 DIAGNOSIS — E78.5 DYSLIPIDEMIA: ICD-10-CM

## 2019-03-12 DIAGNOSIS — E11.9 TYPE 2 DIABETES MELLITUS WITHOUT COMPLICATION, WITH LONG-TERM CURRENT USE OF INSULIN (HCC): ICD-10-CM

## 2019-03-21 ENCOUNTER — HOSPITAL ENCOUNTER (OUTPATIENT)
Dept: OTHER | Facility: HOSPITAL | Age: 44
Setting detail: SPECIMEN
Discharge: HOME | End: 2019-03-21
Attending: UROLOGY | Admitting: UROLOGY

## 2019-03-22 ENCOUNTER — OFFICE VISIT (OUTPATIENT)
Dept: ENDOCRINOLOGY | Age: 44
End: 2019-03-22

## 2019-03-22 VITALS
SYSTOLIC BLOOD PRESSURE: 122 MMHG | DIASTOLIC BLOOD PRESSURE: 60 MMHG | BODY MASS INDEX: 25.81 KG/M2 | HEIGHT: 72 IN | RESPIRATION RATE: 16 BRPM | WEIGHT: 190.6 LBS

## 2019-03-22 DIAGNOSIS — I10 ESSENTIAL HYPERTENSION: ICD-10-CM

## 2019-03-22 DIAGNOSIS — E55.9 VITAMIN D DEFICIENCY: ICD-10-CM

## 2019-03-22 DIAGNOSIS — E78.5 DYSLIPIDEMIA: ICD-10-CM

## 2019-03-22 DIAGNOSIS — E83.52 HYPERCALCEMIA: ICD-10-CM

## 2019-03-22 DIAGNOSIS — Z79.4 TYPE 2 DIABETES MELLITUS WITHOUT COMPLICATION, WITH LONG-TERM CURRENT USE OF INSULIN (HCC): Primary | ICD-10-CM

## 2019-03-22 DIAGNOSIS — E11.9 TYPE 2 DIABETES MELLITUS WITHOUT COMPLICATION, WITH LONG-TERM CURRENT USE OF INSULIN (HCC): Primary | ICD-10-CM

## 2019-03-22 PROCEDURE — 99214 OFFICE O/P EST MOD 30 MIN: CPT | Performed by: NURSE PRACTITIONER

## 2019-03-22 NOTE — PROGRESS NOTES
"Subjective   Gonzalo Mcduffie is a 43 y.o. male is here today for follow-up.  Chief Complaint   Patient presents with   • Hyperlipidemia     lab review; denies problems or concerns; not checking BG   • Hypertension   • Diabetes     /60   Resp 16   Ht 182.9 cm (72\")   Wt 86.5 kg (190 lb 9.6 oz)   BMI 25.85 kg/m²   Current Outpatient Medications on File Prior to Visit   Medication Sig   • atorvastatin (LIPITOR) 40 MG tablet Take 40 mg by mouth Daily.   • B-D ULTRAFINE III SHORT PEN 31G X 8 MM misc Use to inject insulin 1x daily   • [facility]S ASPIRIN ADULT LOW DOSE 81 MG chewable tablet TAKE 1 TABLET BY MOUTH EVERY DAY   • hydrochlorothiazide (HYDRODIURIL) 25 MG tablet    • losartan (COZAAR) 25 MG tablet TAKE 1 TABLET BY MOUTH DAILY   • ONE TOUCH ULTRA TEST test strip Check blood sugars PRN   • SOLIQUA 100-33 UNT-MCG/ML solution pen-injector Inject 60 Units under the skin into the appropriate area as directed Daily. 40 units Q AM, every 4 days if FBS greater than 110 as 4 units (Patient taking differently: Inject 50 Units under the skin into the appropriate area as directed Daily. 40 units Q AM, every 4 days if FBS greater than 110 as 4 units)   • tamsulosin (FLOMAX) 0.4 MG capsule 24 hr capsule TAKE ONE CAPSULE BY MOUTH EVERY DAY   • vitamin D (ERGOCALCIFEROL) 34863 units capsule capsule Take 1 capsule by mouth 2 (Two) Times a Week.   • XIGDUO XR 5-1000 MG tablet Take 2 tablets by mouth Daily.     No current facility-administered medications on file prior to visit.          History of Present Illness  Encounter Diagnoses   Name Primary?   • Essential hypertension    • Vitamin D deficiency    • Type 2 diabetes mellitus without complication, with long-term current use of insulin (CMS/[facility]) Yes   • Dyslipidemia      53-year-old male patient being seen today for routine follow-up for the above-mentioned medical problems.  He did not bring a blood glucose meter with him to today's visit and states that he rarely checks " his blood sugar.  He does report that he recently had urethral stricture surgery and during this procedure a suspicious area on the urethra was biopsied which he just found out came back cancerous.  He had another biopsy done yesterday and is awaiting further recommendations on treatment.  He reports that he is taking all medications as prescribed.  He does report that he was prescribed soliqua 50 units at his last visit and he has not titrated up to the maximum dose of 60 units.  He denies signs and symptoms of hyper and hypoglycemia.    The following portions of the patient's history were reviewed and updated as appropriate: allergies, current medications, past family history, past medical history, past social history, past surgical history and problem list.    Review of Systems   Constitutional: Negative for fatigue.   Endocrine: Negative for cold intolerance and heat intolerance.   Psychiatric/Behavioral: Negative for sleep disturbance.       Objective   Physical Exam   Constitutional: He is oriented to person, place, and time. He appears well-developed and well-nourished. No distress.   Flat affect, not interactive   HENT:   Head: Normocephalic and atraumatic.   Right Ear: External ear normal.   Left Ear: External ear normal.   Nose: Nose normal.   Mouth/Throat: Oropharynx is clear and moist. No oropharyngeal exudate.   Dry skin on face and scalp   Eyes: Conjunctivae and EOM are normal. Pupils are equal, round, and reactive to light. Right eye exhibits no discharge. Left eye exhibits no discharge. No scleral icterus.   Neck: Normal range of motion. Neck supple. No JVD present. No tracheal deviation present. No thyromegaly present.   Cardiovascular: Normal rate, regular rhythm, normal heart sounds and intact distal pulses. Exam reveals no gallop and no friction rub.   No murmur heard.  Pulmonary/Chest: Effort normal and breath sounds normal. No stridor. No respiratory distress. He has no wheezes. He has no  rales. He exhibits no tenderness.   Abdominal: Soft. Bowel sounds are normal. He exhibits no distension and no mass. There is no tenderness. There is no rebound and no guarding. No hernia.   Musculoskeletal: Normal range of motion. He exhibits no edema, tenderness or deformity.   Lymphadenopathy:     He has no cervical adenopathy.   Neurological: He is alert and oriented to person, place, and time. He has normal reflexes. He displays normal reflexes. No cranial nerve deficit. He exhibits normal muscle tone. Coordination normal.   Skin: Skin is warm and dry. No rash noted. He is not diaphoretic. No erythema. No pallor.   Psychiatric: He has a normal mood and affect. His behavior is normal. Judgment and thought content normal.   Nursing note and vitals reviewed.    Results for orders placed or performed in visit on 03/08/19   Hemoglobin A1c   Result Value Ref Range    Hemoglobin A1C 7.70 (H) 4.80 - 5.60 %   C-Peptide   Result Value Ref Range    C-Peptide 3.7 1.1 - 4.4 ng/mL   Vitamin D 25 Hydroxy   Result Value Ref Range    25 Hydroxy, Vitamin D 37.0 30.0 - 100.0 ng/ml   Lipid Panel   Result Value Ref Range    Total Cholesterol 156 0 - 200 mg/dL    Triglycerides 107 0 - 150 mg/dL    HDL Cholesterol 32 (L) 40 - 60 mg/dL    VLDL Cholesterol 21.4 5 - 40 mg/dL    LDL Cholesterol  103 (H) 0 - 100 mg/dL   Comprehensive Metabolic Panel   Result Value Ref Range    Glucose 154 (H) 65 - 99 mg/dL    BUN 15 6 - 20 mg/dL    Creatinine 1.02 0.76 - 1.27 mg/dL    eGFR Non African Am 79 >60 mL/min/1.73    eGFR African Am 96 >60 mL/min/1.73    BUN/Creatinine Ratio 14.7 7.0 - 25.0    Sodium 139 136 - 145 mmol/L    Potassium 4.3 3.5 - 5.2 mmol/L    Chloride 95 (L) 98 - 107 mmol/L    Total CO2 28.0 22.0 - 29.0 mmol/L    Calcium 10.6 (H) 8.6 - 10.5 mg/dL    Total Protein 7.3 6.0 - 8.5 g/dL    Albumin 4.60 3.50 - 5.20 g/dL    Globulin 2.7 gm/dL    A/G Ratio 1.7 g/dL    Total Bilirubin 0.6 0.1 - 1.2 mg/dL    Alkaline Phosphatase 88 39 - 117  U/L    AST (SGOT) 11 1 - 40 U/L    ALT (SGPT) 20 1 - 41 U/L   Cardiovascular Risk Assessment   Result Value Ref Range    Interpretation Note    Diabetes Patient Education   Result Value Ref Range    PDF Image Not applicable          Assessment/Plan   Problems Addressed this Visit        Cardiovascular and Mediastinum    Essential hypertension       Digestive    Vitamin D deficiency       Endocrine    Type 2 diabetes mellitus without complication, with long-term current use of insulin (CMS/[facility]) - Primary       Other    Dyslipidemia          In summary, the patient was seen and examined for the above-mentioned medical problems.  His blood pressure is in satisfactory range today.  His A1c is above goal of 7 I have increased his soliqua to 54 units daily and he is to increase by 2 units every week if fasting blood sugars greater than 110 to a max dose of 58 units daily.  His lipids and vitamin D are within good range.  He was advised on healthy diet to help with his cholesterol.  His calcium is elevated today so we will further examine this with more extensive lab work at his next visit.  He is to continue all medications as prescribed.  He is to follow-up in 6 months with labs prior and has been advised to contact the office should he have any questions or concerns prior to this appointment.    F/u in 6 months with labs prior  Increase Soliqua to 54 units Q AM, increase by 2 units every week if FBS greater than 110. Max 58 units

## 2019-03-22 NOTE — PATIENT INSTRUCTIONS
F/u in 6 months with labs prior  Increase Soliqua to 54 units Q AM, increase by 2 units every week if FBS greater than 110mg/dL. Max 58 units

## 2019-04-08 RX ORDER — PEN NEEDLE, DIABETIC 31 GX5/16"
NEEDLE, DISPOSABLE MISCELLANEOUS
Qty: 100 EACH | Refills: 0 | Status: SHIPPED | OUTPATIENT
Start: 2019-04-08 | End: 2019-07-30 | Stop reason: SDUPTHER

## 2019-04-23 ENCOUNTER — HOSPITAL ENCOUNTER (OUTPATIENT)
Dept: OTHER | Facility: HOSPITAL | Age: 44
Setting detail: SPECIMEN
Discharge: HOME | End: 2019-04-23
Attending: UROLOGY | Admitting: UROLOGY

## 2019-05-16 RX ORDER — INSULIN GLARGINE AND LIXISENATIDE 100; 33 U/ML; UG/ML
INJECTION, SOLUTION SUBCUTANEOUS
Qty: 15 ML | Refills: 5 | Status: SHIPPED | OUTPATIENT
Start: 2019-05-16 | End: 2019-09-24

## 2019-06-24 RX ORDER — DAPAGLIFLOZIN AND METFORMIN HYDROCHLORIDE 5; 1000 MG/1; MG/1
TABLET, FILM COATED, EXTENDED RELEASE ORAL
Qty: 60 TABLET | Refills: 5 | Status: SHIPPED | OUTPATIENT
Start: 2019-06-24 | End: 2019-12-19

## 2019-07-30 ENCOUNTER — LAB REQUISITION (OUTPATIENT)
Dept: LAB | Facility: HOSPITAL | Age: 44
End: 2019-07-30

## 2019-07-30 DIAGNOSIS — C68.0 MALIGNANT NEOPLASM OF URETHRA (HCC): ICD-10-CM

## 2019-07-30 PROCEDURE — 88305 TISSUE EXAM BY PATHOLOGIST: CPT | Performed by: UROLOGY

## 2019-07-30 RX ORDER — PEN NEEDLE, DIABETIC 31 GX5/16"
NEEDLE, DISPOSABLE MISCELLANEOUS
Qty: 100 EACH | Refills: 0 | Status: SHIPPED | OUTPATIENT
Start: 2019-07-30 | End: 2019-11-18 | Stop reason: SDUPTHER

## 2019-07-31 LAB
LAB AP CASE REPORT: NORMAL
PATH REPORT.FINAL DX SPEC: NORMAL
PATH REPORT.GROSS SPEC: NORMAL

## 2019-09-04 DIAGNOSIS — E83.52 HYPERCALCEMIA: ICD-10-CM

## 2019-09-04 DIAGNOSIS — E11.9 TYPE 2 DIABETES MELLITUS WITHOUT COMPLICATION, WITH LONG-TERM CURRENT USE OF INSULIN (HCC): ICD-10-CM

## 2019-09-04 DIAGNOSIS — I10 ESSENTIAL HYPERTENSION: Primary | ICD-10-CM

## 2019-09-04 DIAGNOSIS — Z79.4 TYPE 2 DIABETES MELLITUS WITHOUT COMPLICATION, WITH LONG-TERM CURRENT USE OF INSULIN (HCC): ICD-10-CM

## 2019-09-04 DIAGNOSIS — E78.5 DYSLIPIDEMIA: ICD-10-CM

## 2019-09-04 DIAGNOSIS — E55.9 VITAMIN D DEFICIENCY: ICD-10-CM

## 2019-09-10 ENCOUNTER — LAB (OUTPATIENT)
Dept: ENDOCRINOLOGY | Age: 44
End: 2019-09-10

## 2019-09-10 DIAGNOSIS — E83.52 HYPERCALCEMIA: ICD-10-CM

## 2019-09-10 DIAGNOSIS — E55.9 VITAMIN D DEFICIENCY: ICD-10-CM

## 2019-09-10 DIAGNOSIS — E11.9 TYPE 2 DIABETES MELLITUS WITHOUT COMPLICATION, WITH LONG-TERM CURRENT USE OF INSULIN (HCC): ICD-10-CM

## 2019-09-10 DIAGNOSIS — Z79.4 TYPE 2 DIABETES MELLITUS WITHOUT COMPLICATION, WITH LONG-TERM CURRENT USE OF INSULIN (HCC): ICD-10-CM

## 2019-09-10 DIAGNOSIS — I10 ESSENTIAL HYPERTENSION: ICD-10-CM

## 2019-09-10 DIAGNOSIS — E78.5 DYSLIPIDEMIA: ICD-10-CM

## 2019-09-11 LAB
25(OH)D3+25(OH)D2 SERPL-MCNC: 28.5 NG/ML (ref 30–100)
ALBUMIN SERPL-MCNC: 4.5 G/DL (ref 3.5–5.2)
ALBUMIN/GLOB SERPL: 1.9 G/DL
ALP SERPL-CCNC: 79 U/L (ref 39–117)
ALT SERPL-CCNC: 20 U/L (ref 1–41)
AST SERPL-CCNC: 15 U/L (ref 1–40)
BILIRUB SERPL-MCNC: 0.5 MG/DL (ref 0.2–1.2)
BUN SERPL-MCNC: 13 MG/DL (ref 6–20)
BUN/CREAT SERPL: 13.8 (ref 7–25)
C PEPTIDE SERPL-MCNC: 2.6 NG/ML (ref 1.1–4.4)
CA-I SERPL ISE-MCNC: 5.3 MG/DL (ref 4.5–5.6)
CALCIUM SERPL-MCNC: 9.5 MG/DL (ref 8.6–10.5)
CHLORIDE SERPL-SCNC: 102 MMOL/L (ref 98–107)
CHOLEST SERPL-MCNC: 116 MG/DL (ref 0–200)
CO2 SERPL-SCNC: 29.4 MMOL/L (ref 22–29)
CREAT SERPL-MCNC: 0.94 MG/DL (ref 0.76–1.27)
GLOBULIN SER CALC-MCNC: 2.4 GM/DL
GLUCOSE SERPL-MCNC: 175 MG/DL (ref 65–99)
HBA1C MFR BLD: 8.4 % (ref 4.8–5.6)
HDLC SERPL-MCNC: 28 MG/DL (ref 40–60)
INTERPRETATION: NORMAL
LDLC SERPL CALC-MCNC: 76 MG/DL (ref 0–100)
Lab: NORMAL
MICROALBUMIN UR-MCNC: 25.2 UG/ML
PHOSPHATE SERPL-MCNC: 2.7 MG/DL (ref 2.5–4.5)
POTASSIUM SERPL-SCNC: 4.5 MMOL/L (ref 3.5–5.2)
PROT SERPL-MCNC: 6.9 G/DL (ref 6–8.5)
PTH-INTACT SERPL-MCNC: 34 PG/ML (ref 15–65)
SODIUM SERPL-SCNC: 143 MMOL/L (ref 136–145)
TRIGL SERPL-MCNC: 58 MG/DL (ref 0–150)
VLDLC SERPL CALC-MCNC: 11.6 MG/DL

## 2019-09-17 RX ORDER — ERGOCALCIFEROL 1.25 MG/1
50000 CAPSULE ORAL 2 TIMES WEEKLY
Qty: 26 CAPSULE | Refills: 3 | Status: CANCELLED | OUTPATIENT
Start: 2019-09-17

## 2019-09-17 RX ORDER — ERGOCALCIFEROL 1.25 MG/1
50000 CAPSULE ORAL 2 TIMES WEEKLY
Qty: 26 CAPSULE | Refills: 0 | Status: SHIPPED | OUTPATIENT
Start: 2019-09-19 | End: 2019-09-24 | Stop reason: SDUPTHER

## 2019-09-24 ENCOUNTER — OFFICE VISIT (OUTPATIENT)
Dept: ENDOCRINOLOGY | Age: 44
End: 2019-09-24

## 2019-09-24 VITALS
DIASTOLIC BLOOD PRESSURE: 76 MMHG | SYSTOLIC BLOOD PRESSURE: 126 MMHG | BODY MASS INDEX: 25.33 KG/M2 | HEIGHT: 72 IN | WEIGHT: 187 LBS

## 2019-09-24 DIAGNOSIS — I10 ESSENTIAL HYPERTENSION: ICD-10-CM

## 2019-09-24 DIAGNOSIS — E78.5 DYSLIPIDEMIA: ICD-10-CM

## 2019-09-24 DIAGNOSIS — E55.9 VITAMIN D DEFICIENCY: ICD-10-CM

## 2019-09-24 DIAGNOSIS — E11.9 TYPE 2 DIABETES MELLITUS WITHOUT COMPLICATION, WITH LONG-TERM CURRENT USE OF INSULIN (HCC): Primary | ICD-10-CM

## 2019-09-24 DIAGNOSIS — Z79.4 TYPE 2 DIABETES MELLITUS WITHOUT COMPLICATION, WITH LONG-TERM CURRENT USE OF INSULIN (HCC): Primary | ICD-10-CM

## 2019-09-24 DIAGNOSIS — E83.52 HYPERCALCEMIA: ICD-10-CM

## 2019-09-24 PROCEDURE — 99214 OFFICE O/P EST MOD 30 MIN: CPT | Performed by: NURSE PRACTITIONER

## 2019-09-24 RX ORDER — ERGOCALCIFEROL 1.25 MG/1
50000 CAPSULE ORAL 2 TIMES WEEKLY
Qty: 24 CAPSULE | Refills: 1 | Status: SHIPPED | OUTPATIENT
Start: 2019-09-26 | End: 2021-11-18 | Stop reason: ALTCHOICE

## 2019-09-24 RX ORDER — INSULIN GLARGINE AND LIXISENATIDE 100; 33 U/ML; UG/ML
60 INJECTION, SOLUTION SUBCUTANEOUS DAILY
Qty: 15 ML | Refills: 5 | Status: SHIPPED | OUTPATIENT
Start: 2019-09-24 | End: 2020-02-14 | Stop reason: SDUPTHER

## 2019-09-24 NOTE — PATIENT INSTRUCTIONS
Start vit d 50 k 1 cap twice weekly  Increase soliqua to 60 units  If bs's do not improve to less than 110 mg/dl please let office know so I can either add or change medication to target a1c to less than 7.   Eye exam later this year

## 2019-09-24 NOTE — PROGRESS NOTES
"Subjective   Gonzalo Mcduffie is a 43 y.o. male is here today for follow-up.  Chief Complaint   Patient presents with   • Hyperlipidemia     recent labs, testing BG irregularly, pt did not bring meter   • Hypertension   • Diabetes   • Abnormal Calcium   • Vitamin D Deficiency     /76   Ht 182.9 cm (72\")   Wt 84.8 kg (187 lb)   BMI 25.36 kg/m²   Current Outpatient Medications on File Prior to Visit   Medication Sig   • atorvastatin (LIPITOR) 40 MG tablet Take 40 mg by mouth Daily.   • B-D ULTRAFINE III SHORT PEN 31G X 8 MM misc USE TO INJECT INSULIN ONCE DAILY   • CVS ASPIRIN ADULT LOW DOSE 81 MG chewable tablet TAKE 1 TABLET BY MOUTH EVERY DAY   • hydrochlorothiazide (HYDRODIURIL) 25 MG tablet    • losartan (COZAAR) 25 MG tablet TAKE 1 TABLET BY MOUTH DAILY   • ONE TOUCH ULTRA TEST test strip Check blood sugars PRN   • SOLIQUA 100-33 UNT-MCG/ML solution pen-injector INJECT 60 UNITS. INJECT 40 UNITS IN THE AM EVERY 4 DAYS IF FBS IS GREATER THAN 110 AS 4 UNITS (Patient taking differently: INJECT 54 UNITS DAILY)   • tamsulosin (FLOMAX) 0.4 MG capsule 24 hr capsule TAKE ONE CAPSULE BY MOUTH EVERY DAY   • vitamin D (ERGOCALCIFEROL) 39871 units capsule capsule Take 1 capsule by mouth 2 (Two) Times a Week.   • XIGDUO XR 5-1000 MG tablet TAKE 2 TABLETS BY MOUTH EVERY DAY     No current facility-administered medications on file prior to visit.      Family History   Problem Relation Age of Onset   • Hypertension Mother    • Heart disease Mother    • Diabetes Mother    • Diabetes Father    • JOSE disease Father    • Heart disease Father    • Diabetes Sister    • Cancer Brother      Social History     Tobacco Use   • Smoking status: Never Smoker   Substance Use Topics   • Alcohol use: No   • Drug use: Defer     No Known Allergies      History of Present Illness   Encounter Diagnoses   Name Primary?   • Type 2 diabetes mellitus without complication, with long-term current use of insulin (CMS/[facility]) Yes   • Vitamin D " deficiency    • Essential hypertension    • Dyslipidemia    • Hypercalcemia      43-year-old male patient here today for a follow-up visit.  He has been seen for the above-mentioned problems.  His hemoglobin A1c reflects uncontrolled type 2 diabetes.  His medication list was reviewed and updated.  He is currently not taking the maximum dose of soliqua.  We discussed other options to include such as Pioglitizone to lower his A1c or to go ahead max out his soliqua to 60 units once daily.  He has not been checking his blood sugars so he cannot account for why his A1c is gone up.  He denies any changes to his diet, exercise or weight.  He has had some blurred vision and is due for an eye exam at the end of this year.  He states he has a blood glucose meter at home and enough test strips to start testing his blood sugars.  We discussed goal being less than 110 mg/dL.    The following portions of the patient's history were reviewed and updated as appropriate: allergies, current medications, past family history, past medical history, past social history, past surgical history and problem list.    Review of Systems   Constitutional: Negative.    HENT: Negative.    Cardiovascular: Negative.    Endocrine: Negative.    Skin: Negative.    Neurological: Negative.        Objective   Physical Exam   Constitutional: He is oriented to person, place, and time. He appears well-developed and well-nourished. No distress.   Flat affect, not interactive   HENT:   Head: Normocephalic and atraumatic.   Right Ear: External ear normal.   Left Ear: External ear normal.   Nose: Nose normal.   Mouth/Throat: Oropharynx is clear and moist. No oropharyngeal exudate.   Dry skin on face and scalp   Eyes: Conjunctivae and EOM are normal. Pupils are equal, round, and reactive to light. Right eye exhibits no discharge. Left eye exhibits no discharge. No scleral icterus.   Neck: Normal range of motion. Neck supple. No JVD present. No tracheal deviation  present. No thyromegaly present.   Cardiovascular: Normal rate, regular rhythm, normal heart sounds and intact distal pulses. Exam reveals no gallop and no friction rub.   No murmur heard.  Pulmonary/Chest: Effort normal and breath sounds normal. No stridor. No respiratory distress. He has no wheezes. He has no rales. He exhibits no tenderness.   Abdominal: Soft. Bowel sounds are normal. He exhibits no distension and no mass. There is no tenderness. There is no rebound and no guarding. No hernia.   Musculoskeletal: Normal range of motion. He exhibits no edema, tenderness or deformity.   Lymphadenopathy:     He has no cervical adenopathy.   Neurological: He is alert and oriented to person, place, and time. He has normal reflexes. He displays normal reflexes. No cranial nerve deficit. He exhibits normal muscle tone. Coordination normal.   Skin: Skin is warm and dry. No rash noted. He is not diaphoretic. No erythema. No pallor.   Patient has dry patch on his nose and forehead.  He has been advised to follow-up with dermatology   Psychiatric: He has a normal mood and affect. His behavior is normal. Judgment and thought content normal.   Nursing note and vitals reviewed.    Results for orders placed or performed in visit on 09/10/19   PTH, Intact   Result Value Ref Range    PTH, Intact 34 15 - 65 pg/mL   Phosphorus   Result Value Ref Range    Phosphorus 2.7 2.5 - 4.5 mg/dL   Calcium, Ionized   Result Value Ref Range    Ionized Calcium 5.3 4.5 - 5.6 mg/dL   C-Peptide   Result Value Ref Range    C-Peptide 2.6 1.1 - 4.4 ng/mL   Hemoglobin A1c   Result Value Ref Range    Hemoglobin A1C 8.40 (H) 4.80 - 5.60 %   Vitamin D 25 Hydroxy   Result Value Ref Range    25 Hydroxy, Vitamin D 28.5 (L) 30.0 - 100.0 ng/ml   MicroAlbumin, Urine, Random - Urine, Clean Catch   Result Value Ref Range    Microalbumin, Urine 25.2 Not Estab. ug/mL   Lipid Panel   Result Value Ref Range    Total Cholesterol 116 0 - 200 mg/dL    Triglycerides 58 0  - 150 mg/dL    HDL Cholesterol 28 (L) 40 - 60 mg/dL    VLDL Cholesterol 11.6 mg/dL    LDL Cholesterol  76 0 - 100 mg/dL   Comprehensive Metabolic Panel   Result Value Ref Range    Glucose 175 (H) 65 - 99 mg/dL    BUN 13 6 - 20 mg/dL    Creatinine 0.94 0.76 - 1.27 mg/dL    eGFR Non African Am 87 >60 mL/min/1.73    eGFR African Am 106 >60 mL/min/1.73    BUN/Creatinine Ratio 13.8 7.0 - 25.0    Sodium 143 136 - 145 mmol/L    Potassium 4.5 3.5 - 5.2 mmol/L    Chloride 102 98 - 107 mmol/L    Total CO2 29.4 (H) 22.0 - 29.0 mmol/L    Calcium 9.5 8.6 - 10.5 mg/dL    Total Protein 6.9 6.0 - 8.5 g/dL    Albumin 4.50 3.50 - 5.20 g/dL    Globulin 2.4 gm/dL    A/G Ratio 1.9 g/dL    Total Bilirubin 0.5 0.2 - 1.2 mg/dL    Alkaline Phosphatase 79 39 - 117 U/L    AST (SGOT) 15 1 - 40 U/L    ALT (SGPT) 20 1 - 41 U/L   Cardiovascular Risk Assessment   Result Value Ref Range    Interpretation Note    Diabetes Patient Education   Result Value Ref Range    PDF Image Not applicable          Assessment/Plan   Problems Addressed this Visit        Cardiovascular and Mediastinum    Essential hypertension       Digestive    Vitamin D deficiency       Endocrine    Type 2 diabetes mellitus without complication, with long-term current use of insulin (CMS/[facility]) - Primary       Other    Dyslipidemia    Hypercalcemia          Patient was seen and examined.  Clinically metabolically he is stable.  His hemoglobin A1c reflects uncontrolled type 2 diabetes.  His medication list was reviewed and updated.  He has not been taking his vitamin D supplement.  Currently he is vitamin D insufficient.  His cholesterol is in satisfactory range.  He is been advised to increase soliqua to 60 units once daily and  if his blood sugars fail to improve to less than 110 mg/dL consistently he should contact the office so that we can consider other treatment options.  He does not want to add additional medications at today's visit.  He is currently on xigduo oh and has  adequate kidney function.  His blood pressure is in satisfactory range.  He will follow-up in 6 months with labs prior.  He is been advised to get an eye exam at the end of this year when he is due.  He is been advised to follow-up with dermatology regarding dry patches on his face

## 2019-11-13 RX ORDER — INSULIN GLARGINE AND LIXISENATIDE 100; 33 U/ML; UG/ML
60 INJECTION, SOLUTION SUBCUTANEOUS DAILY
Qty: 15 ML | Refills: 1 | Status: SHIPPED | OUTPATIENT
Start: 2019-11-13 | End: 2020-08-31

## 2019-11-18 RX ORDER — PEN NEEDLE, DIABETIC 31 GX5/16"
NEEDLE, DISPOSABLE MISCELLANEOUS
Qty: 100 EACH | Refills: 2 | Status: SHIPPED | OUTPATIENT
Start: 2019-11-18 | End: 2020-08-24

## 2019-12-06 RX ORDER — ERGOCALCIFEROL 1.25 MG/1
50000 CAPSULE ORAL 2 TIMES WEEKLY
Qty: 26 CAPSULE | Refills: 0 | Status: SHIPPED | OUTPATIENT
Start: 2019-12-09 | End: 2020-02-14 | Stop reason: SDUPTHER

## 2019-12-19 RX ORDER — DAPAGLIFLOZIN AND METFORMIN HYDROCHLORIDE 5; 1000 MG/1; MG/1
TABLET, FILM COATED, EXTENDED RELEASE ORAL
Qty: 60 TABLET | Refills: 3 | Status: SHIPPED | OUTPATIENT
Start: 2019-12-19 | End: 2020-02-14

## 2020-01-14 ENCOUNTER — LAB REQUISITION (OUTPATIENT)
Dept: LAB | Facility: HOSPITAL | Age: 45
End: 2020-01-14

## 2020-01-14 DIAGNOSIS — C68.0 MALIGNANT NEOPLASM OF URETHRA (HCC): ICD-10-CM

## 2020-01-14 PROCEDURE — 88305 TISSUE EXAM BY PATHOLOGIST: CPT | Performed by: UROLOGY

## 2020-01-15 LAB
LAB AP CASE REPORT: NORMAL
LAB AP DIAGNOSIS COMMENT: NORMAL
PATH REPORT.FINAL DX SPEC: NORMAL
PATH REPORT.GROSS SPEC: NORMAL

## 2020-02-04 ENCOUNTER — TRANSCRIBE ORDERS (OUTPATIENT)
Dept: WOUND CARE | Facility: HOSPITAL | Age: 45
End: 2020-02-04

## 2020-02-04 DIAGNOSIS — Z71.89 ENCOUNTER FOR OSTOMY NURSE CONSULTATION: Primary | ICD-10-CM

## 2020-02-14 ENCOUNTER — APPOINTMENT (OUTPATIENT)
Dept: PREADMISSION TESTING | Facility: HOSPITAL | Age: 45
End: 2020-02-14

## 2020-02-14 VITALS
WEIGHT: 193.31 LBS | RESPIRATION RATE: 18 BRPM | HEART RATE: 87 BPM | HEIGHT: 72 IN | OXYGEN SATURATION: 96 % | SYSTOLIC BLOOD PRESSURE: 118 MMHG | TEMPERATURE: 98.8 F | DIASTOLIC BLOOD PRESSURE: 74 MMHG | BODY MASS INDEX: 26.18 KG/M2

## 2020-02-14 LAB
ANION GAP SERPL CALCULATED.3IONS-SCNC: 14.3 MMOL/L (ref 5–15)
BUN BLD-MCNC: 15 MG/DL (ref 6–20)
BUN/CREAT SERPL: 15.8 (ref 7–25)
CALCIUM SPEC-SCNC: 10 MG/DL (ref 8.6–10.5)
CHLORIDE SERPL-SCNC: 96 MMOL/L (ref 98–107)
CO2 SERPL-SCNC: 25.7 MMOL/L (ref 22–29)
CREAT BLD-MCNC: 0.95 MG/DL (ref 0.76–1.27)
DEPRECATED RDW RBC AUTO: 38.9 FL (ref 37–54)
ERYTHROCYTE [DISTWIDTH] IN BLOOD BY AUTOMATED COUNT: 14.2 % (ref 12.3–15.4)
GFR SERPL CREATININE-BSD FRML MDRD: 86 ML/MIN/1.73
GLUCOSE BLD-MCNC: 317 MG/DL (ref 65–99)
HCT VFR BLD AUTO: 54.3 % (ref 37.5–51)
HGB BLD-MCNC: 18.1 G/DL (ref 13–17.7)
MCH RBC QN AUTO: 27.3 PG (ref 26.6–33)
MCHC RBC AUTO-ENTMCNC: 33.3 G/DL (ref 31.5–35.7)
MCV RBC AUTO: 81.8 FL (ref 79–97)
PLATELET # BLD AUTO: 216 10*3/MM3 (ref 140–450)
PMV BLD AUTO: 9.7 FL (ref 6–12)
POTASSIUM BLD-SCNC: 4.7 MMOL/L (ref 3.5–5.2)
RBC # BLD AUTO: 6.64 10*6/MM3 (ref 4.14–5.8)
SODIUM BLD-SCNC: 136 MMOL/L (ref 136–145)
WBC NRBC COR # BLD: 5.75 10*3/MM3 (ref 3.4–10.8)

## 2020-02-14 PROCEDURE — 85027 COMPLETE CBC AUTOMATED: CPT | Performed by: UROLOGY

## 2020-02-14 PROCEDURE — 80048 BASIC METABOLIC PNL TOTAL CA: CPT | Performed by: UROLOGY

## 2020-02-14 PROCEDURE — 36415 COLL VENOUS BLD VENIPUNCTURE: CPT

## 2020-02-14 RX ORDER — TAMSULOSIN HYDROCHLORIDE 0.4 MG/1
1 CAPSULE ORAL DAILY
COMMUNITY

## 2020-02-14 RX ORDER — LOSARTAN POTASSIUM 25 MG/1
25 TABLET ORAL DAILY
COMMUNITY

## 2020-02-14 NOTE — DISCHARGE INSTRUCTIONS
ARRIVE DAY OF SURGERY AT 11:00 AM TO MAIN SURGERY          Take the following medications the morning of surgery with a small sip of water:    LOSARTAN    General Instructions:  • Do not eat or drink anything after midnight the night before surgery.  • Infants may have breast milk up to four hours before surgery.  • Infants drinking formula may drink formula up to six hours before surgery.   • Patients who avoid smoking, chewing tobacco and alcohol for 4 weeks prior to surgery have a reduced risk of post-operative complications.  Quit smoking as many days before surgery as you can.  • Do not smoke, use chewing tobacco or drink alcohol the day of surgery.   • If applicable bring your C-PAP/ BI-PAP machine.  • Bring any papers given to you in the doctor’s office.  • Wear clean comfortable clothes.  • Do not wear contact lenses, false eyelashes or make-up.  Bring a case for your glasses.   • Bring crutches or walker if applicable.  • Remove all piercings.  Leave jewelry and any other valuables at home.  • Hair extensions with metal clips must be removed prior to surgery.  • The Pre-Admission Testing nurse will instruct you to bring medications if unable to obtain an accurate list in Pre-Admission Testing.            Preventing a Surgical Site Infection:  • For 2 to 3 days before surgery, avoid shaving with a razor because the razor can irritate skin and make it easier to develop an infection.    • Any areas of open skin can increase the risk of a post-operative wound infection by allowing bacteria to enter and travel throughout the body.  Notify your surgeon if you have any skin wounds / rashes even if it is not near the expected surgical site.  The area will need assessed to determine if surgery should be delayed until it is healed.  • The night prior to surgery sleep in a clean bed with clean clothing.  Do not allow pets to sleep with you.  • Shower on the morning of surgery using a fresh bar of anti-bacterial soap  (such as Dial) and clean washcloth.  Dry with a clean towel and dress in clean clothing.  • Ask your surgeon if you will be receiving antibiotics prior to surgery.  • Make sure you, your family, and all healthcare providers clean their hands with soap and water or an alcohol based hand  before caring for you or your wound.    Day of surgery:  Your arrival time is approximately two hours before your scheduled surgery time.  Upon arrival, a Pre-op nurse and Anesthesiologist will review your health history, obtain vital signs, and answer questions you may have.  The only belongings needed at this time will be your home medications and if applicable your C-PAP/BI-PAP machine.  If you are staying overnight your family can leave the rest of your belongings in the car and bring them to your room later.  A Pre-op nurse will start an IV and you may receive medication in preparation for surgery, including something to help you relax.  Your family will be able to see you in the Pre-op area.  Two visitors at a time will be allowed in the Pre-op room.  While you are in surgery your family should notify the waiting room  if they leave the waiting room area and provide a contact phone number.    Please be aware that surgery does come with discomfort.  We want to make every effort to control your discomfort so please discuss any uncontrolled symptoms with your nurse.   Your doctor will most likely have prescribed pain medications.      If you are going home after surgery you will receive individualized written care instructions before being discharged.  A responsible adult must drive you to and from the hospital on the day of your surgery and stay with you for 24 hours.    If you are staying overnight following surgery, you will be transported to your hospital room following the recovery period.  [facility] has all private rooms.    If you have any questions please call Pre-Admission Testing at  [phone].  Deductibles and co-payments are collected on the day of service. Please be prepared to pay the required co-pay, deductible or deposit on the day of service as defined by your plan.

## 2020-02-17 ENCOUNTER — ANESTHESIA EVENT (OUTPATIENT)
Dept: PERIOP | Facility: HOSPITAL | Age: 45
End: 2020-02-17

## 2020-02-17 ENCOUNTER — ANESTHESIA (OUTPATIENT)
Dept: PERIOP | Facility: HOSPITAL | Age: 45
End: 2020-02-17

## 2020-02-17 ENCOUNTER — HOSPITAL ENCOUNTER (OUTPATIENT)
Facility: HOSPITAL | Age: 45
Setting detail: HOSPITAL OUTPATIENT SURGERY
Discharge: HOME | End: 2020-02-17
Attending: UROLOGY | Admitting: ANESTHESIOLOGY

## 2020-02-17 VITALS
RESPIRATION RATE: 16 BRPM | HEART RATE: 83 BPM | TEMPERATURE: 97.2 F | WEIGHT: 191.8 LBS | DIASTOLIC BLOOD PRESSURE: 88 MMHG | BODY MASS INDEX: 26.01 KG/M2 | OXYGEN SATURATION: 98 % | SYSTOLIC BLOOD PRESSURE: 131 MMHG

## 2020-02-17 DIAGNOSIS — N20.0 RENAL CALCULUS: Primary | ICD-10-CM

## 2020-02-17 DIAGNOSIS — C68.0 URETHRA CANCER (HCC): ICD-10-CM

## 2020-02-17 LAB
GLUCOSE BLDC GLUCOMTR-MCNC: 198 MG/DL (ref 70–130)
GLUCOSE BLDC GLUCOMTR-MCNC: 198 MG/DL (ref 70–130)

## 2020-02-17 PROCEDURE — 82962 GLUCOSE BLOOD TEST: CPT

## 2020-02-17 PROCEDURE — 25010000002 DEXAMETHASONE PER 1 MG: Performed by: NURSE ANESTHETIST, CERTIFIED REGISTERED

## 2020-02-17 PROCEDURE — 25010000003 CEFAZOLIN 1-4 GM/50ML-% SOLUTION: Performed by: UROLOGY

## 2020-02-17 PROCEDURE — 88305 TISSUE EXAM BY PATHOLOGIST: CPT | Performed by: UROLOGY

## 2020-02-17 PROCEDURE — 25010000002 ONDANSETRON PER 1 MG: Performed by: NURSE ANESTHETIST, CERTIFIED REGISTERED

## 2020-02-17 PROCEDURE — 25010000002 FENTANYL CITRATE (PF) 100 MCG/2ML SOLUTION: Performed by: NURSE ANESTHETIST, CERTIFIED REGISTERED

## 2020-02-17 PROCEDURE — 25010000002 PROPOFOL 10 MG/ML EMULSION: Performed by: NURSE ANESTHETIST, CERTIFIED REGISTERED

## 2020-02-17 RX ORDER — ONDANSETRON 2 MG/ML
INJECTION INTRAMUSCULAR; INTRAVENOUS AS NEEDED
Status: DISCONTINUED | OUTPATIENT
Start: 2020-02-17 | End: 2020-02-17 | Stop reason: SURG

## 2020-02-17 RX ORDER — HYDROCODONE BITARTRATE AND ACETAMINOPHEN 5; 325 MG/1; MG/1
1-2 TABLET ORAL EVERY 6 HOURS PRN
Qty: 30 TABLET | Refills: 0 | Status: SHIPPED | OUTPATIENT
Start: 2020-02-17 | End: 2020-10-20

## 2020-02-17 RX ORDER — FENTANYL CITRATE 50 UG/ML
INJECTION, SOLUTION INTRAMUSCULAR; INTRAVENOUS AS NEEDED
Status: DISCONTINUED | OUTPATIENT
Start: 2020-02-17 | End: 2020-02-17 | Stop reason: SURG

## 2020-02-17 RX ORDER — HYDRALAZINE HYDROCHLORIDE 20 MG/ML
5 INJECTION INTRAMUSCULAR; INTRAVENOUS
Status: DISCONTINUED | OUTPATIENT
Start: 2020-02-17 | End: 2020-02-17 | Stop reason: HOSPADM

## 2020-02-17 RX ORDER — FLUMAZENIL 0.1 MG/ML
0.2 INJECTION INTRAVENOUS AS NEEDED
Status: DISCONTINUED | OUTPATIENT
Start: 2020-02-17 | End: 2020-02-17 | Stop reason: HOSPADM

## 2020-02-17 RX ORDER — PROMETHAZINE HYDROCHLORIDE 25 MG/ML
6.25 INJECTION, SOLUTION INTRAMUSCULAR; INTRAVENOUS ONCE AS NEEDED
Status: DISCONTINUED | OUTPATIENT
Start: 2020-02-17 | End: 2020-02-17 | Stop reason: HOSPADM

## 2020-02-17 RX ORDER — CEPHALEXIN 250 MG/1
250 CAPSULE ORAL 3 TIMES DAILY
Qty: 9 CAPSULE | Refills: 0 | Status: SHIPPED | OUTPATIENT
Start: 2020-02-17 | End: 2020-02-20

## 2020-02-17 RX ORDER — PROPOFOL 10 MG/ML
VIAL (ML) INTRAVENOUS AS NEEDED
Status: DISCONTINUED | OUTPATIENT
Start: 2020-02-17 | End: 2020-02-17 | Stop reason: SURG

## 2020-02-17 RX ORDER — CEFAZOLIN SODIUM 1 G/50ML
1 INJECTION, SOLUTION INTRAVENOUS ONCE
Status: COMPLETED | OUTPATIENT
Start: 2020-02-17 | End: 2020-02-17

## 2020-02-17 RX ORDER — FENTANYL CITRATE 50 UG/ML
100 INJECTION, SOLUTION INTRAMUSCULAR; INTRAVENOUS
Status: DISCONTINUED | OUTPATIENT
Start: 2020-02-17 | End: 2020-02-17 | Stop reason: HOSPADM

## 2020-02-17 RX ORDER — LIDOCAINE HYDROCHLORIDE 20 MG/ML
INJECTION, SOLUTION INFILTRATION; PERINEURAL AS NEEDED
Status: DISCONTINUED | OUTPATIENT
Start: 2020-02-17 | End: 2020-02-17 | Stop reason: SURG

## 2020-02-17 RX ORDER — ONDANSETRON 2 MG/ML
4 INJECTION INTRAMUSCULAR; INTRAVENOUS ONCE AS NEEDED
Status: DISCONTINUED | OUTPATIENT
Start: 2020-02-17 | End: 2020-02-17 | Stop reason: HOSPADM

## 2020-02-17 RX ORDER — FAMOTIDINE 10 MG/ML
20 INJECTION, SOLUTION INTRAVENOUS ONCE
Status: COMPLETED | OUTPATIENT
Start: 2020-02-17 | End: 2020-02-17

## 2020-02-17 RX ORDER — SODIUM CHLORIDE 0.9 % (FLUSH) 0.9 %
3-10 SYRINGE (ML) INJECTION AS NEEDED
Status: DISCONTINUED | OUTPATIENT
Start: 2020-02-17 | End: 2020-02-17 | Stop reason: HOSPADM

## 2020-02-17 RX ORDER — PROMETHAZINE HYDROCHLORIDE 25 MG/1
25 TABLET ORAL ONCE AS NEEDED
Status: DISCONTINUED | OUTPATIENT
Start: 2020-02-17 | End: 2020-02-17 | Stop reason: HOSPADM

## 2020-02-17 RX ORDER — EPHEDRINE SULFATE 50 MG/ML
5 INJECTION, SOLUTION INTRAVENOUS ONCE AS NEEDED
Status: DISCONTINUED | OUTPATIENT
Start: 2020-02-17 | End: 2020-02-17 | Stop reason: HOSPADM

## 2020-02-17 RX ORDER — DOCUSATE SODIUM 250 MG
250 CAPSULE ORAL 2 TIMES DAILY PRN
Qty: 30 CAPSULE | Refills: 0 | Status: SHIPPED | OUTPATIENT
Start: 2020-02-17 | End: 2020-10-20

## 2020-02-17 RX ORDER — HYDROCODONE BITARTRATE AND ACETAMINOPHEN 5; 325 MG/1; MG/1
1 TABLET ORAL ONCE AS NEEDED
Status: CANCELLED | OUTPATIENT
Start: 2020-02-17 | End: 2020-02-27

## 2020-02-17 RX ORDER — OXYCODONE AND ACETAMINOPHEN 7.5; 325 MG/1; MG/1
1 TABLET ORAL EVERY 4 HOURS PRN
Status: DISCONTINUED | OUTPATIENT
Start: 2020-02-17 | End: 2020-02-17 | Stop reason: HOSPADM

## 2020-02-17 RX ORDER — SODIUM CHLORIDE, SODIUM LACTATE, POTASSIUM CHLORIDE, CALCIUM CHLORIDE 600; 310; 30; 20 MG/100ML; MG/100ML; MG/100ML; MG/100ML
9 INJECTION, SOLUTION INTRAVENOUS CONTINUOUS
Status: DISCONTINUED | OUTPATIENT
Start: 2020-02-17 | End: 2020-02-17 | Stop reason: HOSPADM

## 2020-02-17 RX ORDER — HYDROCODONE BITARTRATE AND ACETAMINOPHEN 7.5; 325 MG/1; MG/1
1 TABLET ORAL ONCE AS NEEDED
Status: DISCONTINUED | OUTPATIENT
Start: 2020-02-17 | End: 2020-02-17 | Stop reason: HOSPADM

## 2020-02-17 RX ORDER — PROMETHAZINE HYDROCHLORIDE 25 MG/1
25 SUPPOSITORY RECTAL ONCE AS NEEDED
Status: DISCONTINUED | OUTPATIENT
Start: 2020-02-17 | End: 2020-02-17 | Stop reason: HOSPADM

## 2020-02-17 RX ORDER — SODIUM CHLORIDE 0.9 % (FLUSH) 0.9 %
3 SYRINGE (ML) INJECTION EVERY 12 HOURS SCHEDULED
Status: DISCONTINUED | OUTPATIENT
Start: 2020-02-17 | End: 2020-02-17 | Stop reason: HOSPADM

## 2020-02-17 RX ORDER — LIDOCAINE HYDROCHLORIDE 10 MG/ML
0.5 INJECTION, SOLUTION EPIDURAL; INFILTRATION; INTRACAUDAL; PERINEURAL ONCE AS NEEDED
Status: DISCONTINUED | OUTPATIENT
Start: 2020-02-17 | End: 2020-02-17 | Stop reason: HOSPADM

## 2020-02-17 RX ORDER — MIDAZOLAM HYDROCHLORIDE 1 MG/ML
2 INJECTION INTRAMUSCULAR; INTRAVENOUS
Status: DISCONTINUED | OUTPATIENT
Start: 2020-02-17 | End: 2020-02-17 | Stop reason: HOSPADM

## 2020-02-17 RX ORDER — FENTANYL CITRATE 50 UG/ML
50 INJECTION, SOLUTION INTRAMUSCULAR; INTRAVENOUS
Status: DISCONTINUED | OUTPATIENT
Start: 2020-02-17 | End: 2020-02-17 | Stop reason: HOSPADM

## 2020-02-17 RX ORDER — MIDAZOLAM HYDROCHLORIDE 1 MG/ML
1 INJECTION INTRAMUSCULAR; INTRAVENOUS
Status: DISCONTINUED | OUTPATIENT
Start: 2020-02-17 | End: 2020-02-17 | Stop reason: HOSPADM

## 2020-02-17 RX ORDER — HYDROMORPHONE HYDROCHLORIDE 1 MG/ML
0.5 INJECTION, SOLUTION INTRAMUSCULAR; INTRAVENOUS; SUBCUTANEOUS
Status: DISCONTINUED | OUTPATIENT
Start: 2020-02-17 | End: 2020-02-17 | Stop reason: HOSPADM

## 2020-02-17 RX ORDER — MAGNESIUM HYDROXIDE 1200 MG/15ML
LIQUID ORAL AS NEEDED
Status: DISCONTINUED | OUTPATIENT
Start: 2020-02-17 | End: 2020-02-17 | Stop reason: HOSPADM

## 2020-02-17 RX ORDER — DEXAMETHASONE SODIUM PHOSPHATE 10 MG/ML
INJECTION INTRAMUSCULAR; INTRAVENOUS AS NEEDED
Status: DISCONTINUED | OUTPATIENT
Start: 2020-02-17 | End: 2020-02-17 | Stop reason: SURG

## 2020-02-17 RX ADMIN — DEXAMETHASONE SODIUM PHOSPHATE 4 MG: 10 INJECTION INTRAMUSCULAR; INTRAVENOUS at 12:52

## 2020-02-17 RX ADMIN — FENTANYL CITRATE 50 MCG: 50 INJECTION INTRAMUSCULAR; INTRAVENOUS at 14:34

## 2020-02-17 RX ADMIN — FAMOTIDINE 20 MG: 10 INJECTION INTRAVENOUS at 10:48

## 2020-02-17 RX ADMIN — LIDOCAINE HYDROCHLORIDE 100 MG: 20 INJECTION, SOLUTION INFILTRATION; PERINEURAL at 12:45

## 2020-02-17 RX ADMIN — SODIUM CHLORIDE, POTASSIUM CHLORIDE, SODIUM LACTATE AND CALCIUM CHLORIDE 9 ML/HR: 600; 310; 30; 20 INJECTION, SOLUTION INTRAVENOUS at 15:41

## 2020-02-17 RX ADMIN — CEFAZOLIN SODIUM 1 G: 1 INJECTION, SOLUTION INTRAVENOUS at 12:42

## 2020-02-17 RX ADMIN — PROPOFOL 200 MG: 10 INJECTION, EMULSION INTRAVENOUS at 12:45

## 2020-02-17 RX ADMIN — ONDANSETRON HYDROCHLORIDE 4 MG: 2 SOLUTION INTRAMUSCULAR; INTRAVENOUS at 12:52

## 2020-02-17 RX ADMIN — SODIUM CHLORIDE, POTASSIUM CHLORIDE, SODIUM LACTATE AND CALCIUM CHLORIDE 9 ML/HR: 600; 310; 30; 20 INJECTION, SOLUTION INTRAVENOUS at 10:48

## 2020-02-17 RX ADMIN — FENTANYL CITRATE 50 MCG: 50 INJECTION INTRAMUSCULAR; INTRAVENOUS at 12:54

## 2020-02-17 NOTE — ANESTHESIA POSTPROCEDURE EVALUATION
Patient: Gonzalo Mcduffie    Procedure Summary     Date:  02/17/20 Room / Location:   [facility]  [facility]    Anesthesia Start:  1242 Anesthesia Stop:  1446    Procedures:       RIGHT EXTRACORPOREAL SHOCKWAVE LITHOTRIPSY, (Right )      CYSTOSCOPY BLADDER URETHRAL BIOPSIES (N/A ) Diagnosis:      Surgeon:  Tono Nair MD Provider:  Rishabh Royal MD    Anesthesia Type:  general ASA Status:  2          Anesthesia Type: general    Vitals  Vitals Value Taken Time   /92 2/17/2020  3:30 PM   Temp 36.2 °C (97.2 °F) 2/17/2020  3:38 PM   Pulse 93 2/17/2020  3:38 PM   Resp 16 2/17/2020  3:38 PM   SpO2 97 % 2/17/2020  3:38 PM           Post Anesthesia Care and Evaluation    Patient location during evaluation: bedside  Patient participation: complete - patient participated  Level of consciousness: awake  Pain score: 1  Pain management: adequate  Airway patency: patent  Anesthetic complications: No anesthetic complications  PONV Status: controlled  Cardiovascular status: acceptable  Respiratory status: acceptable  Hydration status: acceptable    Comments: --------------------            02/17/20               1606     --------------------   BP:       131/88     Pulse:      83       Resp:       16       Temp:                SpO2:      98%      --------------------

## 2020-02-17 NOTE — OP NOTE
BRIEF OPERATIVE NOTE:    Date:      2/17/2020    Author:   Tono Nair MD    Attending Physician: Dr. Tono Nair MD  Assistant(s): None    Prior to the beginning of the procedure the team paused to verify the patient's identity, as well as the procedure to be performed and the correct side/site. All equipment required was ready and available. The patient was positioned appropriately.     Preoperative Diagnosis:   1. Urethral Urothelial Carcinoma  2. Right Renal Calculus    Postoperative Diagnosis: Same    Procedure Performed:  1. Cystoscopy  2. Bladder biopsy, multiple  3. Urethral biopsy, multiple  4. 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  Known table 6mm right renal calculus.  We discussed the benefits/risks/expectations and the patient desires surgical intervention. Risks include bleeding, infection, urinary tract infection/sepsis, retained stone fragments, damage to adjacent tissues including the genitalia, chronic pain, numbness, incontinence, stroke, heart attack, death, and need for additional procedures.    Findings:   1. Bladder with hyperemia on the posterior bladder base - multiple biopsies taken from representative areas of the bladder (see Pathology report)  2. Bulbar/Membranous urethra with post-BCG changes, no obivous tumor, narrowing at distal and proximal bulbar urethral segments, but accommodated 23 Fr rigid cystoscope sheath  3. Multiple biopsies of representative areas of the prostatic and bulbar urethral segments (see path report)  4. Right renal stone treated with ESWL, stone destroyed    Estimated Blood Loss: 5 mL    Fluids: See anesthesia record    Specimens: Bladder and urethral biopsies    Complications: None    Drains: 18 Fr urethral Rivera catheter, 10 mL sterile water instilled in balloon    Plan:   1. Transfer to  PACU, then home  2. Keflex x 3 days  3. [facility] will call with biopsy results and plans or next step in care.    I was present and scrubbed for the entire procedure.    Tono Nair MD  [facility]  General & Reconstructive Urology  Office: [phone]  Fax: [phone]

## 2020-02-17 NOTE — OP NOTE
OPERATIVE NOTE:     Date:      2/17/2020     Author:   Tono Nair MD     Attending Physician: Dr. Tono Nair MD  Assistant(s): None     Prior to the beginning of the procedure the team paused to verify the patient's identity, as well as the procedure to be performed and the correct side/site. All equipment required was ready and available. The patient was positioned appropriately.      Preoperative Diagnosis:   1. Urethral Urothelial Carcinoma  2. Right Renal Calculus     Postoperative Diagnosis: Same     Procedure Performed:  1. Cystoscopy  2. Bladder biopsy, multiple  3. Urethral biopsy, multiple  4. 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  Known table 6mm right renal calculus.  We discussed the benefits/risks/expectations and the patient desires surgical intervention. Risks include bleeding, infection, urinary tract infection/sepsis, retained stone fragments, damage to adjacent tissues including the genitalia, chronic pain, numbness, incontinence, stroke, heart attack, death, and need for additional procedures.    Findings:   1. Bladder with hyperemia on the posterior bladder base - multiple biopsies taken from representative areas of the bladder (see Pathology report)  2. Bulbar/Membranous urethra with post-BCG changes, no obivous tumor, narrowing at distal and proximal bulbar urethral segments, but accommodated 23 Fr rigid cystoscope sheath  3. Multiple biopsies of representative areas of the prostatic and bulbar urethral segments (see path report)  4. Right renal stone treated with ESWL, stone destroyed     Procedure in detail:  After informed consent was obtained, the patient was taken to the OR and general anesthesia was induced. The patient was placed on the OR table in a supine position and placed under general anesthesia. A timeout  was performed and all team members were in agreement. He was placed in the dorsal lithotomy position.  The [employer] [place] cystoscope was introduced and pan-cystoscopy was performed.  Full inspection of the urothelium was undertaken using the 30 degree lens.  No evidence of malignancy was seen, and there were no bladder stones noted.    Using the biopsy forceps, I performed 13 total biopsies with the bladder at approximately 3/4 capacity from the right lateral wall, left lateral wall, posterior wall, dome, prostatic urethra and proximal and distal bulbar urethra.  Using Bugbee cautery, each site was carefully fulgurated until no continued bleeding was seen.  No perforation occurred.  The biopsies were sent for histologic analysis.  The bladder was drained.      He was transitioned carefully to the Lithotriptor table. Fluoroscopy demonstrated a ~6mm radio-opaque calculus at the lower pole of the right renal shadow, consistent with patient's known stone burden.     Right ESWL was then performed. A total of 3000 shocks were administered to the stone at a max energy level 24kV. The stone fragmented well. The patient tolerated the procedure well and was awakened from anesthesia in the OR. The patient was transferred to the recovery room in stable condition.    Estimated Blood Loss: 5 mL     Fluids: See anesthesia record     Specimens: Bladder and urethral biopsies     Complications: None     Drains: 18 Fr urethral Rivera catheter, 10 mL sterile water instilled in balloon     Plan:   1. Transfer to PACU, then home  2. Keflex x 3 days  3. [facility] will call with biopsy results and plans or next step in care.     I was present and scrubbed for the entire procedure.     Tono Nair MD  [facility]  General & Reconstructive Urology  Office: [phone]  Fax: [phone]

## 2020-02-17 NOTE — ANESTHESIA PREPROCEDURE EVALUATION
Anesthesia Evaluation     Patient summary reviewed and Nursing notes reviewed   NPO Solid Status: > 8 hours             Airway   Mallampati: II  TM distance: >3 FB  Neck ROM: full  no difficulty expected  Dental - normal exam     Pulmonary - negative pulmonary ROS and normal exam   Cardiovascular - normal exam    (+) hypertension,       Neuro/Psych- negative ROS  GI/Hepatic/Renal/Endo    (+)   diabetes mellitus,     Musculoskeletal (-) negative ROS    Abdominal  - normal exam   Substance History - negative use     OB/GYN negative ob/gyn ROS         Other                        Anesthesia Plan    ASA 2     general     intravenous induction     Anesthetic plan, all risks, benefits, and alternatives have been provided, discussed and informed consent has been obtained with: patient.    Plan discussed with CRNA.

## 2020-02-17 NOTE — ANESTHESIA PROCEDURE NOTES
Airway  Urgency: elective    Date/Time: 2/17/2020 12:48 PM    General Information and Staff    Patient location during procedure: OR  Anesthesiologist: Rishabh Royal MD  CRNA: Joesph Packer CRNA    Indications and Patient Condition  Indications for airway management: airway protection    Preoxygenated: yes  MILS maintained throughout  Mask difficulty assessment: 1 - vent by mask    Final Airway Details  Final airway type: supraglottic airway      Successful airway: unique  Size 5    Number of attempts at approach: 1  Assessment: lips, teeth, and gum same as pre-op and atraumatic intubation

## 2020-02-19 LAB
CYTO UR: NORMAL
LAB AP CASE REPORT: NORMAL
LAB AP DIAGNOSIS COMMENT: NORMAL
PATH REPORT.FINAL DX SPEC: NORMAL
PATH REPORT.GROSS SPEC: NORMAL

## 2020-02-22 NOTE — H&P
[facility] H&P Update  2/17/2020    No changes to available H&P.    Risks/Benefits/Expectations discussed     Tono Nair MD  [facility]  General & Reconstructive Urology  Office: [phone]  Fax: [phone]

## 2020-03-05 ENCOUNTER — LAB REQUISITION (OUTPATIENT)
Dept: LAB | Facility: HOSPITAL | Age: 45
End: 2020-03-05

## 2020-03-05 DIAGNOSIS — C68.0 MALIGNANT NEOPLASM OF URETHRA (HCC): ICD-10-CM

## 2020-03-05 DIAGNOSIS — N35.919 UNSPECIFIED URETHRAL STRICTURE, MALE, UNSPECIFIED SITE: ICD-10-CM

## 2020-03-05 PROCEDURE — 88305 TISSUE EXAM BY PATHOLOGIST: CPT | Performed by: UROLOGY

## 2020-03-10 DIAGNOSIS — I10 ESSENTIAL HYPERTENSION: Primary | ICD-10-CM

## 2020-03-10 DIAGNOSIS — E11.9 TYPE 2 DIABETES MELLITUS WITHOUT COMPLICATION, WITH LONG-TERM CURRENT USE OF INSULIN (HCC): ICD-10-CM

## 2020-03-10 DIAGNOSIS — Z79.4 TYPE 2 DIABETES MELLITUS WITHOUT COMPLICATION, WITH LONG-TERM CURRENT USE OF INSULIN (HCC): ICD-10-CM

## 2020-03-10 DIAGNOSIS — E83.52 HYPERCALCEMIA: ICD-10-CM

## 2020-03-10 DIAGNOSIS — E55.9 VITAMIN D DEFICIENCY: ICD-10-CM

## 2020-03-11 LAB
25(OH)D3+25(OH)D2 SERPL-MCNC: 27.8 NG/ML (ref 30–100)
ALBUMIN SERPL-MCNC: 4.4 G/DL (ref 3.5–5.2)
ALBUMIN/GLOB SERPL: 1.8 G/DL
ALP SERPL-CCNC: 81 U/L (ref 39–117)
ALT SERPL-CCNC: 22 U/L (ref 1–41)
AST SERPL-CCNC: 13 U/L (ref 1–40)
BILIRUB SERPL-MCNC: 0.8 MG/DL (ref 0.2–1.2)
BUN SERPL-MCNC: 19 MG/DL (ref 6–20)
BUN/CREAT SERPL: 21.3 (ref 7–25)
C PEPTIDE SERPL-MCNC: 1.5 NG/ML (ref 1.1–4.4)
CA-I SERPL ISE-MCNC: 5.1 MG/DL (ref 4.5–5.6)
CALCIUM SERPL-MCNC: 9.5 MG/DL (ref 8.6–10.5)
CHLORIDE SERPL-SCNC: 97 MMOL/L (ref 98–107)
CHOLEST SERPL-MCNC: 135 MG/DL (ref 0–200)
CO2 SERPL-SCNC: 27.3 MMOL/L (ref 22–29)
CREAT SERPL-MCNC: 0.89 MG/DL (ref 0.76–1.27)
GLOBULIN SER CALC-MCNC: 2.4 GM/DL
GLUCOSE SERPL-MCNC: 142 MG/DL (ref 65–99)
HBA1C MFR BLD: 9.3 % (ref 4.8–5.6)
HDLC SERPL-MCNC: 27 MG/DL (ref 40–60)
INTERPRETATION: NORMAL
LDLC SERPL CALC-MCNC: 84 MG/DL (ref 0–100)
Lab: NORMAL
PHOSPHATE SERPL-MCNC: 3.2 MG/DL (ref 2.5–4.5)
POTASSIUM SERPL-SCNC: 4.1 MMOL/L (ref 3.5–5.2)
PROT SERPL-MCNC: 6.8 G/DL (ref 6–8.5)
PTH-INTACT SERPL-MCNC: 23 PG/ML (ref 15–65)
SODIUM SERPL-SCNC: 137 MMOL/L (ref 136–145)
TRIGL SERPL-MCNC: 119 MG/DL (ref 0–150)
VLDLC SERPL CALC-MCNC: 23.8 MG/DL

## 2020-03-24 ENCOUNTER — OFFICE VISIT (OUTPATIENT)
Dept: ENDOCRINOLOGY | Age: 45
End: 2020-03-24

## 2020-03-24 VITALS
WEIGHT: 191 LBS | DIASTOLIC BLOOD PRESSURE: 78 MMHG | HEIGHT: 72 IN | BODY MASS INDEX: 25.87 KG/M2 | SYSTOLIC BLOOD PRESSURE: 110 MMHG

## 2020-03-24 DIAGNOSIS — E83.52 HYPERCALCEMIA: ICD-10-CM

## 2020-03-24 DIAGNOSIS — E11.65 UNCONTROLLED TYPE 2 DIABETES MELLITUS WITH HYPERGLYCEMIA (HCC): Primary | ICD-10-CM

## 2020-03-24 DIAGNOSIS — E55.9 VITAMIN D DEFICIENCY: ICD-10-CM

## 2020-03-24 DIAGNOSIS — I10 ESSENTIAL HYPERTENSION: ICD-10-CM

## 2020-03-24 DIAGNOSIS — E78.5 DYSLIPIDEMIA: ICD-10-CM

## 2020-03-24 PROCEDURE — 99214 OFFICE O/P EST MOD 30 MIN: CPT | Performed by: NURSE PRACTITIONER

## 2020-03-24 NOTE — PROGRESS NOTES
"Serene Mcduffie is a 44 y.o. male is here today for follow-up.  Chief Complaint   Patient presents with   • Diabetes     recent labs    • Hypertension   • Vitamin D Deficiency     /78 (BP Location: Left arm, Patient Position: Sitting, Cuff Size: Adult)   Ht 182.9 cm (72.01\")   Wt 86.6 kg (191 lb)   BMI 25.90 kg/m²   Current Outpatient Medications on File Prior to Visit   Medication Sig   • aspirin 81 MG tablet Take 81 mg by mouth Daily.   • atorvastatin (LIPITOR) 40 MG tablet Take 40 mg by mouth Daily.   • B-D ULTRAFINE III SHORT PEN 31G X 8 MM misc USE TO INJECT INSULIN ONCE DAILY   • losartan (COZAAR) 25 MG tablet Take 25 mg by mouth Daily.   • ONE TOUCH ULTRA TEST test strip Check blood sugars PRN   • SOLIQUA 100-33 UNT-MCG/ML solution pen-injector injection Inject 60 Units under the skin into the appropriate area as directed Daily.   • tamsulosin (FLOMAX) 0.4 MG capsule 24 hr capsule Take 1 capsule by mouth Daily.   • vitamin D (ERGOCALCIFEROL) 07311 units capsule capsule Take 1 capsule by mouth 2 (Two) Times a Week.   • [DISCONTINUED] dapagliflozin-metformin HCl ER (XIGDUO XR) 5-1000 MG tablet Take 1 tablet by mouth Daily.   • docusate sodium (COLACE) 250 MG capsule Take 1 capsule by mouth 2 (Two) Times a Day As Needed for Constipation.   • hydrochlorothiazide (HYDRODIURIL) 25 MG tablet Take 25 mg by mouth Daily.   • HYDROcodone-acetaminophen (NORCO) 5-325 MG per tablet Take 1-2 tablets by mouth Every 6 (Six) Hours As Needed for Moderate Pain  (Pain).     No current facility-administered medications on file prior to visit.      Family History   Problem Relation Age of Onset   • Hypertension Mother    • Heart disease Mother    • Diabetes Mother    • Diabetes Father    • JOSE disease Father    • Heart disease Father    • Diabetes Sister    • Cancer Brother    • Malig Hyperthermia Neg Hx      Social History     Tobacco Use   • Smoking status: Never Smoker   • Smokeless tobacco: Never Used "   Substance Use Topics   • Alcohol use: No   • Drug use: Never     No Known Allergies      History of Present Illness   Encounter Diagnoses   Name Primary?   • Hypercalcemia    • Vitamin D deficiency    • Uncontrolled type 2 diabetes mellitus with hyperglycemia (CMS/[facility]) Yes   • Essential hypertension    • Dyslipidemia      44-year-old male patient here today for follow-up visit.  He has been seen for the above-mentioned problems.  He has not been checking his blood sugars and did not bring his blood glucose meter with him to today's visit.  He had labs prior to today's visit which reflect uncontrolled type 2 diabetes.  His hemoglobin A1c most recently is 9.1.  He denies any hypoglycemic events or any symptoms associated with hypoglycemia.  He has been going through cancer treatments for urethral cancer.  His hemoglobin A1c has been steadily climbing over the last several checks.  He is sometimes forgetting to take his xigduo  several times a week.  He states he is more consistent with taking his Soliqua and currently he is at max dose at 60 units daily.  We discussed doing a continuous glucose monitoring professional miguel angel view to monitor his blood sugars for the next 2 weeks so that we can make any changes to his medications based on his blood glucose trends.  He has been advised to also keep a record of his blood sugars along with any type of food intake so that we can also determine how to change his medications.    The following portions of the patient's history were reviewed and updated as appropriate: allergies, current medications, past family history, past medical history, past social history, past surgical history and problem list.    Review of Systems   Constitutional: Negative for appetite change and fatigue.   Eyes: Negative for visual disturbance.   Respiratory: Negative for cough.    Cardiovascular: Negative for leg swelling.   Gastrointestinal: Negative for constipation and diarrhea.   Endocrine:  Negative for cold intolerance, heat intolerance, polydipsia, polyphagia and polyuria.   Genitourinary: Negative for frequency.   Neurological: Negative for numbness.       Objective   Physical Exam   Constitutional: He is oriented to person, place, and time. He appears well-developed and well-nourished. No distress.   Flat affect   HENT:   Head: Normocephalic and atraumatic.   Right Ear: External ear normal.   Left Ear: External ear normal.   Nose: Nose normal.   Eyes: Pupils are equal, round, and reactive to light. Right eye exhibits no discharge. Left eye exhibits no discharge.   Neck: Normal range of motion. Neck supple. Carotid bruit is not present. No tracheal deviation, no edema and no erythema present. No thyromegaly present.   Cardiovascular: Normal rate, regular rhythm, normal heart sounds and intact distal pulses. Exam reveals no gallop and no friction rub.   No murmur heard.  Pulmonary/Chest: Effort normal and breath sounds normal. No respiratory distress. He has no wheezes. He has no rales.   Abdominal: Soft. Bowel sounds are normal. He exhibits no distension. There is no tenderness.   Musculoskeletal: Normal range of motion. He exhibits no edema or deformity.   Lymphadenopathy:     He has no cervical adenopathy.   Neurological: He is alert and oriented to person, place, and time. Coordination normal.   Skin: Skin is warm and dry. No rash noted. He is not diaphoretic. No erythema. No pallor.   Psychiatric: He has a normal mood and affect. His behavior is normal. Judgment and thought content normal.   Nursing note and vitals reviewed.    Results for orders placed or performed in visit on 03/10/20   Comprehensive Metabolic Panel   Result Value Ref Range    Glucose 142 (H) 65 - 99 mg/dL    BUN 19 6 - 20 mg/dL    Creatinine 0.89 0.76 - 1.27 mg/dL    eGFR Non African Am 92 >60 mL/min/1.73    eGFR African Am 112 >60 mL/min/1.73    BUN/Creatinine Ratio 21.3 7.0 - 25.0    Sodium 137 136 - 145 mmol/L    Potassium  4.1 3.5 - 5.2 mmol/L    Chloride 97 (L) 98 - 107 mmol/L    Total CO2 27.3 22.0 - 29.0 mmol/L    Calcium 9.5 8.6 - 10.5 mg/dL    Total Protein 6.8 6.0 - 8.5 g/dL    Albumin 4.40 3.50 - 5.20 g/dL    Globulin 2.4 gm/dL    A/G Ratio 1.8 g/dL    Total Bilirubin 0.8 0.2 - 1.2 mg/dL    Alkaline Phosphatase 81 39 - 117 U/L    AST (SGOT) 13 1 - 40 U/L    ALT (SGPT) 22 1 - 41 U/L   Lipid Panel   Result Value Ref Range    Total Cholesterol 135 0 - 200 mg/dL    Triglycerides 119 0 - 150 mg/dL    HDL Cholesterol 27 (L) 40 - 60 mg/dL    VLDL Cholesterol 23.8 mg/dL    LDL Cholesterol  84 0 - 100 mg/dL   C-Peptide   Result Value Ref Range    C-Peptide 1.5 1.1 - 4.4 ng/mL   Hemoglobin A1c   Result Value Ref Range    Hemoglobin A1C 9.30 (H) 4.80 - 5.60 %   Vitamin D 25 Hydroxy   Result Value Ref Range    25 Hydroxy, Vitamin D 27.8 (L) 30.0 - 100.0 ng/ml   Calcium, Ionized   Result Value Ref Range    Ionized Calcium 5.1 4.5 - 5.6 mg/dL   Phosphorus   Result Value Ref Range    Phosphorus 3.2 2.5 - 4.5 mg/dL   PTH, Intact   Result Value Ref Range    PTH, Intact 23 15 - 65 pg/mL   Cardiovascular Risk Assessment   Result Value Ref Range    Interpretation Note    Diabetes Patient Education   Result Value Ref Range    PDF Image Not applicable          Assessment/Plan   Problems Addressed this Visit        Cardiovascular and Mediastinum    Essential hypertension       Digestive    Vitamin D deficiency       Endocrine    Uncontrolled type 2 diabetes mellitus with hyperglycemia (CMS/[facility]) - Primary    Relevant Medications    dapagliflozin-metformin HCl ER (Xigduo XR) 5-1000 MG tablet       Other    Dyslipidemia    RESOLVED: Hypercalcemia        In summary patient was seen and examined.  Metabolically and clinically he presents stable.  His A1c however reflects uncontrolled diabetes.  He has not been taking his vitamin D as prescribed and is vitamin D deficient.  He has not been checking his blood sugars and his blood sugars are currently not  well controlled.  He was given a professional sensor at today's visit to monitor for the next 14 days to return to the office so that we can upload and make recommendations based on blood glucose patterns.  He is been advised to keep a record of his blood sugars as well as food intake.  He will follow-up with Dr. Barroso or myself in 6 months with labs prior.  Blood pressure in satisfactory range.

## 2020-03-24 NOTE — PATIENT INSTRUCTIONS
Return [place]  sensor to the office at the end of 14 days.  Please include your name and address and date of birth with the actual sensor device.  Once it is downloaded and reviewed I will let you know of any further recommendations on your medications to improve your overall glycemic control    Please keep a log of your meals and blood sugar readings along with the dates and times so that I can also help to interpret your data from your sensor download.    Bring your blood glucose meter each visit.

## 2020-04-20 RX ORDER — DAPAGLIFLOZIN AND METFORMIN HYDROCHLORIDE 5; 1000 MG/1; MG/1
TABLET, FILM COATED, EXTENDED RELEASE ORAL
Qty: 60 TABLET | Refills: 3 | Status: SHIPPED | OUTPATIENT
Start: 2020-04-20

## 2020-06-11 ENCOUNTER — PREP FOR SURGERY (OUTPATIENT)
Dept: OTHER | Facility: HOSPITAL | Age: 45
End: 2020-06-11

## 2020-06-11 DIAGNOSIS — K63.5 COLON POLYPS: Primary | ICD-10-CM

## 2020-06-12 ENCOUNTER — TELEPHONE (OUTPATIENT)
Dept: GASTROENTEROLOGY | Facility: CLINIC | Age: 45
End: 2020-06-12

## 2020-06-12 PROBLEM — K63.5 COLON POLYPS: Status: ACTIVE | Noted: 2020-06-12

## 2020-06-12 NOTE — TELEPHONE ENCOUNTER
----- Message from Kishore Brito MD sent at 6/11/2020  6:16 PM EDT -----  Regarding: OA colon  OA colon ok, split dose  suprep or miralax prep

## 2020-07-06 ENCOUNTER — TRANSCRIBE ORDERS (OUTPATIENT)
Dept: SLEEP MEDICINE | Facility: HOSPITAL | Age: 45
End: 2020-07-06

## 2020-07-06 DIAGNOSIS — Z01.818 OTHER SPECIFIED PRE-OPERATIVE EXAMINATION: Primary | ICD-10-CM

## 2020-07-08 ENCOUNTER — LAB (OUTPATIENT)
Dept: LAB | Facility: HOSPITAL | Age: 45
End: 2020-07-08

## 2020-07-08 DIAGNOSIS — Z01.818 OTHER SPECIFIED PRE-OPERATIVE EXAMINATION: ICD-10-CM

## 2020-07-08 PROCEDURE — U0004 COV-19 TEST NON-CDC HGH THRU: HCPCS

## 2020-07-08 PROCEDURE — C9803 HOPD COVID-19 SPEC COLLECT: HCPCS

## 2020-07-09 LAB
REF LAB TEST METHOD: NORMAL
SARS-COV-2 RNA RESP QL NAA+PROBE: NOT DETECTED

## 2020-07-10 ENCOUNTER — HOSPITAL ENCOUNTER (OUTPATIENT)
Facility: HOSPITAL | Age: 45
Setting detail: HOSPITAL OUTPATIENT SURGERY
Discharge: HOME | End: 2020-07-10
Attending: INTERNAL MEDICINE | Admitting: INTERNAL MEDICINE

## 2020-07-10 ENCOUNTER — ANESTHESIA (OUTPATIENT)
Dept: GASTROENTEROLOGY | Facility: HOSPITAL | Age: 45
End: 2020-07-10

## 2020-07-10 ENCOUNTER — ANESTHESIA EVENT (OUTPATIENT)
Dept: GASTROENTEROLOGY | Facility: HOSPITAL | Age: 45
End: 2020-07-10

## 2020-07-10 VITALS
WEIGHT: 188 LBS | SYSTOLIC BLOOD PRESSURE: 114 MMHG | RESPIRATION RATE: 16 BRPM | TEMPERATURE: 97.9 F | BODY MASS INDEX: 25.49 KG/M2 | DIASTOLIC BLOOD PRESSURE: 77 MMHG | OXYGEN SATURATION: 98 % | HEART RATE: 70 BPM

## 2020-07-10 DIAGNOSIS — K63.5 COLON POLYPS: ICD-10-CM

## 2020-07-10 LAB — GLUCOSE BLDC GLUCOMTR-MCNC: 137 MG/DL (ref 70–130)

## 2020-07-10 PROCEDURE — 25010000002 PROPOFOL 10 MG/ML EMULSION: Performed by: ANESTHESIOLOGY

## 2020-07-10 PROCEDURE — S0260 H&P FOR SURGERY: HCPCS | Performed by: INTERNAL MEDICINE

## 2020-07-10 PROCEDURE — 82962 GLUCOSE BLOOD TEST: CPT

## 2020-07-10 PROCEDURE — 88305 TISSUE EXAM BY PATHOLOGIST: CPT | Performed by: INTERNAL MEDICINE

## 2020-07-10 PROCEDURE — 45385 COLONOSCOPY W/LESION REMOVAL: CPT | Performed by: INTERNAL MEDICINE

## 2020-07-10 DEVICE — DEV CLIP ENDO RESOLUTION360 CONTRL ROT 235CM: Type: IMPLANTABLE DEVICE | Site: DESCENDING COLON | Status: FUNCTIONAL

## 2020-07-10 RX ORDER — SODIUM CHLORIDE, SODIUM LACTATE, POTASSIUM CHLORIDE, CALCIUM CHLORIDE 600; 310; 30; 20 MG/100ML; MG/100ML; MG/100ML; MG/100ML
1000 INJECTION, SOLUTION INTRAVENOUS CONTINUOUS
Status: DISCONTINUED | OUTPATIENT
Start: 2020-07-10 | End: 2020-07-10 | Stop reason: HOSPADM

## 2020-07-10 RX ORDER — PROPOFOL 10 MG/ML
VIAL (ML) INTRAVENOUS AS NEEDED
Status: DISCONTINUED | OUTPATIENT
Start: 2020-07-10 | End: 2020-07-10 | Stop reason: SURG

## 2020-07-10 RX ORDER — LIDOCAINE HYDROCHLORIDE 20 MG/ML
INJECTION, SOLUTION INFILTRATION; PERINEURAL AS NEEDED
Status: DISCONTINUED | OUTPATIENT
Start: 2020-07-10 | End: 2020-07-10 | Stop reason: SURG

## 2020-07-10 RX ADMIN — PROPOFOL 300 MG: 10 INJECTION, EMULSION INTRAVENOUS at 09:11

## 2020-07-10 RX ADMIN — LIDOCAINE HYDROCHLORIDE 100 MG: 20 INJECTION, SOLUTION INFILTRATION; PERINEURAL at 09:11

## 2020-07-10 RX ADMIN — SODIUM CHLORIDE, POTASSIUM CHLORIDE, SODIUM LACTATE AND CALCIUM CHLORIDE 1000 ML: 600; 310; 30; 20 INJECTION, SOLUTION INTRAVENOUS at 07:44

## 2020-07-10 NOTE — ANESTHESIA PREPROCEDURE EVALUATION
Anesthesia Evaluation     Patient summary reviewed and Nursing notes reviewed   NPO Solid Status: > 8 hours             Airway   Mallampati: II  TM distance: >3 FB  Neck ROM: full  no difficulty expected  Dental - normal exam     Pulmonary - negative pulmonary ROS and normal exam   Cardiovascular - normal exam    (+) hypertension,       Neuro/Psych- negative ROS  GI/Hepatic/Renal/Endo    (+)   diabetes mellitus,     Musculoskeletal (-) negative ROS    Abdominal  - normal exam   Substance History - negative use     OB/GYN negative ob/gyn ROS         Other                          Anesthesia Plan    ASA 2     MAC       Anesthetic plan, all risks, benefits, and alternatives have been provided, discussed and informed consent has been obtained with: patient.    Plan discussed with CRNA.

## 2020-07-10 NOTE — ANESTHESIA POSTPROCEDURE EVALUATION
Patient: Gonzalo Mcduffie    Procedure Summary     Date:  07/10/20 Room / Location:  [facility] ENDOSCOPY 7 /  [facility]    Anesthesia Start:  0909 Anesthesia Stop:  0944    Procedure:  COLONOSCOPY INTO CECUM AND NORMAL TI WITH HOT AND COLD SNARE POLYPECTOMIES WITH X2 RESOLUTION CLIPS (N/A ) Diagnosis:       Colon polyps      (Colon polyps [K63.5])    Surgeon:  Kishore Brito MD Provider:  Rishabh Hopper MD    Anesthesia Type:  MAC ASA Status:  2          Anesthesia Type: MAC    Vitals  Vitals Value Taken Time   /77 7/10/2020 10:06 AM   Temp 36.6 °C (97.9 °F) 7/10/2020  9:53 AM   Pulse 70 7/10/2020 10:06 AM   Resp 16 7/10/2020 10:06 AM   SpO2 98 % 7/10/2020 10:06 AM           Post Anesthesia Care and Evaluation    Patient location during evaluation: bedside  Patient participation: complete - patient participated  Level of consciousness: awake and alert  Pain management: adequate  Airway patency: patent  Anesthetic complications: No anesthetic complications    Cardiovascular status: acceptable  Respiratory status: acceptable  Hydration status: acceptable    Comments: /77 (BP Location: Left arm, Patient Position: Lying)   Pulse 70   Temp 36.6 °C (97.9 °F) (Oral)   Resp 16   Wt 85.3 kg (188 lb)   SpO2 98%   BMI 25.49 kg/m²

## 2020-07-10 NOTE — DISCHARGE INSTRUCTIONS
For the next 24 hours patient needs to be with a responsible adult.    For 24 hours DO NOT drive, operate machinery, appliances, drink alcohol, make important decisions or sign legal documents.    Start with a light or bland diet if you are feeling sick to your stomach otherwise advance to regular diet as tolerated.    Follow recommendations on procedure report if provided by your doctor.    Call Dr VILLEDA for problems [phone]    Problems may include but not limited to: large amounts of bleeding, trouble breathing, repeated vomiting, severe unrelieved pain, fever or chills.

## 2020-07-10 NOTE — H&P
[facility]  Pre Procedure History & Physical    Chief Complaint:   Time for my colonoscopy    Subjective     HPI:   44 y.o. male fhx CRC, father    Past Medical History:   Past Medical History:   Diagnosis Date   • Diabetes (CMS/HCC)    • High cholesterol    • History of kidney stones    • HTN (hypertension)    • Kidney disease    • Type 2 diabetes mellitus (CMS/HCC)    • Urethral cancer (CMS/HCC)    • Urethral stricture        Family History:  Family History   Problem Relation Age of Onset   • Hypertension Mother    • Heart disease Mother    • Diabetes Mother    • Diabetes Father    • JOSE disease Father    • Heart disease Father    • Diabetes Sister    • Cancer Brother    • Malig Hyperthermia Neg Hx        Social History:   reports that he has never smoked. He has never used smokeless tobacco. He reports that he does not drink alcohol or use drugs.    Medications:   Medications Prior to Admission   Medication Sig Dispense Refill Last Dose   • aspirin 81 MG tablet Take 81 mg by mouth Daily.   Taking   • atorvastatin (LIPITOR) 40 MG tablet Take 40 mg by mouth Daily.   Taking   • B-D ULTRAFINE III SHORT PEN 31G X 8 MM misc USE TO INJECT INSULIN ONCE DAILY 100 each 2 Taking   • docusate sodium (COLACE) 250 MG capsule Take 1 capsule by mouth 2 (Two) Times a Day As Needed for Constipation. 30 capsule 0 Not Taking   • hydrochlorothiazide (HYDRODIURIL) 25 MG tablet Take 25 mg by mouth Daily.   Not Taking   • HYDROcodone-acetaminophen (NORCO) 5-325 MG per tablet Take 1-2 tablets by mouth Every 6 (Six) Hours As Needed for Moderate Pain  (Pain). 30 tablet 0 Not Taking   • losartan (COZAAR) 25 MG tablet Take 25 mg by mouth Daily.   Taking   • ONE TOUCH ULTRA TEST test strip Check blood sugars PRN 50 each 4 Taking   • SOLIQUA 100-33 UNT-MCG/ML solution pen-injector injection Inject 60 Units under the skin into the appropriate area as directed Daily. 15 mL 1 Taking   • tamsulosin (FLOMAX) 0.4 MG capsule  24 hr capsule Take 1 capsule by mouth Daily.   Taking   • vitamin D (ERGOCALCIFEROL) 05570 units capsule capsule Take 1 capsule by mouth 2 (Two) Times a Week. 24 capsule 1 Taking   • XIGDUO XR 5-1000 MG tablet TAKE 2 TABLETS BY MOUTH EVERY DAY 60 tablet 3        Allergies:  Patient has no known allergies.    ROS:    Pertinent items are noted in HPI     Objective     Weight 85.3 kg (188 lb).    Physical Exam   Constitutional: Pt is oriented to person, place, and time and well-developed, well-nourished, and in no distress.   HENT:   Mouth/Throat: Oropharynx is clear and moist.   Neck: Normal range of motion. Neck supple.   Cardiovascular: Normal rate, regular rhythm and normal heart sounds.    Pulmonary/Chest: Effort normal and breath sounds normal. No respiratory distress. No  wheezes.   Abdominal: Soft. Bowel sounds are normal.   Skin: Skin is warm and dry.   Psychiatric: Mood, memory, affect and judgment normal.     Assessment/Plan     Diagnosis:  Encounter for screening for colon cancer, fhx crc    Anticipated Surgical Procedure:  Colonoscopy    The risks, benefits, and alternatives of this procedure have been discussed with the patient or the responsible party- the patient understands and agrees to proceed.

## 2020-07-15 ENCOUNTER — TELEPHONE (OUTPATIENT)
Dept: GASTROENTEROLOGY | Facility: CLINIC | Age: 45
End: 2020-07-15

## 2020-07-15 ENCOUNTER — PREP FOR SURGERY (OUTPATIENT)
Dept: OTHER | Facility: HOSPITAL | Age: 45
End: 2020-07-15

## 2020-07-15 DIAGNOSIS — K63.5 COLON POLYPS: Primary | ICD-10-CM

## 2020-07-15 NOTE — PROGRESS NOTES
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

## 2020-07-15 NOTE — TELEPHONE ENCOUNTER
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

## 2020-07-16 ENCOUNTER — TELEPHONE (OUTPATIENT)
Dept: GASTROENTEROLOGY | Facility: CLINIC | Age: 45
End: 2020-07-16

## 2020-08-24 RX ORDER — PEN NEEDLE, DIABETIC 31 GX5/16"
NEEDLE, DISPOSABLE MISCELLANEOUS
Qty: 100 EACH | Refills: 2 | Status: SHIPPED | OUTPATIENT
Start: 2020-08-24 | End: 2021-07-09 | Stop reason: SDUPTHER

## 2020-08-31 RX ORDER — INSULIN GLARGINE AND LIXISENATIDE 100; 33 U/ML; UG/ML
60 INJECTION, SOLUTION SUBCUTANEOUS DAILY
Qty: 15 PEN | Refills: 0 | Status: SHIPPED | OUTPATIENT
Start: 2020-08-31 | End: 2020-10-20

## 2020-10-06 ENCOUNTER — LAB (OUTPATIENT)
Dept: ENDOCRINOLOGY | Age: 45
End: 2020-10-06

## 2020-10-06 DIAGNOSIS — I10 ESSENTIAL HYPERTENSION: Primary | ICD-10-CM

## 2020-10-06 DIAGNOSIS — E55.9 VITAMIN D DEFICIENCY: ICD-10-CM

## 2020-10-06 DIAGNOSIS — Z79.4 TYPE 2 DIABETES MELLITUS WITHOUT COMPLICATION, WITH LONG-TERM CURRENT USE OF INSULIN (HCC): ICD-10-CM

## 2020-10-06 DIAGNOSIS — E11.9 TYPE 2 DIABETES MELLITUS WITHOUT COMPLICATION, WITH LONG-TERM CURRENT USE OF INSULIN (HCC): ICD-10-CM

## 2020-10-06 DIAGNOSIS — E78.5 DYSLIPIDEMIA: ICD-10-CM

## 2020-10-07 LAB
25(OH)D3+25(OH)D2 SERPL-MCNC: 31.5 NG/ML (ref 30–100)
ALBUMIN SERPL-MCNC: 4.6 G/DL (ref 3.5–5.2)
ALBUMIN/GLOB SERPL: 2.3 G/DL
ALP SERPL-CCNC: 89 U/L (ref 39–117)
ALT SERPL-CCNC: 25 U/L (ref 1–41)
AST SERPL-CCNC: 16 U/L (ref 1–40)
BILIRUB SERPL-MCNC: 0.4 MG/DL (ref 0–1.2)
BUN SERPL-MCNC: 19 MG/DL (ref 6–20)
BUN/CREAT SERPL: 21.6 (ref 7–25)
C PEPTIDE SERPL-MCNC: 2.2 NG/ML (ref 1.1–4.4)
CALCIUM SERPL-MCNC: 9.6 MG/DL (ref 8.6–10.5)
CHLORIDE SERPL-SCNC: 102 MMOL/L (ref 98–107)
CHOLEST SERPL-MCNC: 148 MG/DL (ref 0–200)
CO2 SERPL-SCNC: 24.7 MMOL/L (ref 22–29)
CREAT SERPL-MCNC: 0.88 MG/DL (ref 0.76–1.27)
FT4I SERPL CALC-MCNC: 2.5 (ref 1.2–4.9)
GLOBULIN SER CALC-MCNC: 2 GM/DL
GLUCOSE SERPL-MCNC: 129 MG/DL (ref 65–99)
HBA1C MFR BLD: 9.8 % (ref 4.8–5.6)
HDLC SERPL-MCNC: 28 MG/DL (ref 40–60)
INTERPRETATION: NORMAL
LDLC SERPL CALC-MCNC: 89 MG/DL (ref 0–100)
Lab: NORMAL
MICROALBUMIN UR-MCNC: 13.8 UG/ML
POTASSIUM SERPL-SCNC: 4.3 MMOL/L (ref 3.5–5.2)
PROT SERPL-MCNC: 6.6 G/DL (ref 6–8.5)
SODIUM SERPL-SCNC: 140 MMOL/L (ref 136–145)
T3RU NFR SERPL: 29 % (ref 24–39)
T4 SERPL-MCNC: 8.6 UG/DL (ref 4.5–12)
TRIGL SERPL-MCNC: 153 MG/DL (ref 0–150)
TSH SERPL DL<=0.005 MIU/L-ACNC: 1.07 UIU/ML (ref 0.45–4.5)
VLDLC SERPL CALC-MCNC: 30.6 MG/DL

## 2020-10-20 ENCOUNTER — OFFICE VISIT (OUTPATIENT)
Dept: ENDOCRINOLOGY | Age: 45
End: 2020-10-20

## 2020-10-20 VITALS
HEIGHT: 72 IN | BODY MASS INDEX: 25.65 KG/M2 | SYSTOLIC BLOOD PRESSURE: 118 MMHG | DIASTOLIC BLOOD PRESSURE: 62 MMHG | WEIGHT: 189.4 LBS

## 2020-10-20 DIAGNOSIS — E55.9 VITAMIN D DEFICIENCY: ICD-10-CM

## 2020-10-20 DIAGNOSIS — I10 ESSENTIAL HYPERTENSION: ICD-10-CM

## 2020-10-20 DIAGNOSIS — E78.5 DYSLIPIDEMIA: ICD-10-CM

## 2020-10-20 DIAGNOSIS — E11.65 UNCONTROLLED TYPE 2 DIABETES MELLITUS WITH HYPERGLYCEMIA (HCC): Primary | ICD-10-CM

## 2020-10-20 PROCEDURE — 99214 OFFICE O/P EST MOD 30 MIN: CPT | Performed by: NURSE PRACTITIONER

## 2020-10-20 RX ORDER — SEMAGLUTIDE 1.34 MG/ML
0.5 INJECTION, SOLUTION SUBCUTANEOUS WEEKLY
Qty: 1 PEN | Refills: 5 | Status: SHIPPED | OUTPATIENT
Start: 2020-10-20 | End: 2021-04-01

## 2020-10-20 RX ORDER — INSULIN GLARGINE 300 U/ML
60 INJECTION, SOLUTION SUBCUTANEOUS DAILY
Qty: 18 ML | Refills: 1 | Status: SHIPPED | OUTPATIENT
Start: 2020-10-20 | End: 2021-04-16

## 2020-10-20 NOTE — PATIENT INSTRUCTIONS
Stop soliqua  Toujeo 60 units once daily continue to increase by 2 units every 3 days if your morning blood sugars are greater than 110 mg/dL  Ozempic 0.25 mg weekly for 4 weeks then increase to 0.5 mg weekly   Monitor your blood sugars with the sensor and let me know how your blood sugars are doing so that we can make changes to improve your overall glycemic control

## 2020-10-20 NOTE — PROGRESS NOTES
"Subjective   Gonzalo Mcduffie is a 44 y.o. male is here today for follow-up.  Chief Complaint   Patient presents with   • Diabetes     F/U type 2 diabetes, recent labs, doesnt check BS very often, due for eye exam dec 2020     /62   Ht 182.9 cm (72\")   Wt 85.9 kg (189 lb 6.4 oz)   BMI 25.69 kg/m²   Current Outpatient Medications on File Prior to Visit   Medication Sig   • aspirin 81 MG tablet Take 81 mg by mouth Daily.   • atorvastatin (LIPITOR) 40 MG tablet Take 40 mg by mouth Daily.   • B-D ULTRAFINE III SHORT PEN 31G X 8 MM misc USE TO INJECT INSULIN ONCE DAILY   • docusate sodium (COLACE) 250 MG capsule Take 1 capsule by mouth 2 (Two) Times a Day As Needed for Constipation.   • hydrochlorothiazide (HYDRODIURIL) 25 MG tablet Take 25 mg by mouth Daily.   • losartan (COZAAR) 25 MG tablet Take 25 mg by mouth Daily.   • ONE TOUCH ULTRA TEST test strip Check blood sugars PRN   • tamsulosin (FLOMAX) 0.4 MG capsule 24 hr capsule Take 1 capsule by mouth Daily.   • vitamin D (ERGOCALCIFEROL) 66126 units capsule capsule Take 1 capsule by mouth 2 (Two) Times a Week.   • XIGDUO XR 5-1000 MG tablet TAKE 2 TABLETS BY MOUTH EVERY DAY   • [DISCONTINUED] SOLIQUA 100-33 UNT-MCG/ML solution pen-injector injection INJECT 60 UNITS UNDER THE SKIN INTO THE APPROPRIATE AREA AS DIRECTED DAILY.   • HYDROcodone-acetaminophen (NORCO) 5-325 MG per tablet Take 1-2 tablets by mouth Every 6 (Six) Hours As Needed for Moderate Pain  (Pain).     No current facility-administered medications on file prior to visit.      Family History   Problem Relation Age of Onset   • Hypertension Mother    • Heart disease Mother    • Diabetes Mother    • Diabetes Father    • JOSE disease Father    • Heart disease Father    • Diabetes Sister    • Cancer Brother    • Malig Hyperthermia Neg Hx      Social History     Tobacco Use   • Smoking status: Never Smoker   • Smokeless tobacco: Never Used   Substance Use Topics   • Alcohol use: No   • Drug use: Never "     No Known Allergies      History of Present Illness   Encounter Diagnoses   Name Primary?   • Uncontrolled type 2 diabetes mellitus with hyperglycemia (CMS/[facility]) Yes   • Vitamin D deficiency    • Essential hypertension    • Dyslipidemia      This is a 44-year-old male patient here today for a follow-up exam.  He has been seen for uncontrolled type 2 diabetes.  Hypertension, dyslipidemia, and vitamin D deficiency.  His medication list was reviewed for accuracy.  We discussed the benefit of a continuous glucose monitoring sensor.  He is being given a freestyle miguel angel pro in the past however we do not have any record of that beyond returned for review.  He was given a sample day of the freestyle miguel angel #2 sensor along with the reader device.  He denies any hypoglycemic events.  He has had type 2 diabetes for approximately 17 to 18 years.  His A1c reflects uncontrolled diabetes.  He also has significant family history of diabetes that include both parents and siblings.  He has unable to tell me what his blood sugars are doing as he does not check them very often.  He is due for an eye exam in December of this year.    The following portions of the patient's history were reviewed and updated as appropriate: allergies, current medications, past family history, past medical history, past social history, past surgical history and problem list.    Review of Systems   Constitutional: Negative.    Cardiovascular: Negative.    Gastrointestinal: Negative.    Endocrine: Negative.    Neurological: Negative.    Psychiatric/Behavioral: Negative for sleep disturbance.       Objective   Physical Exam  Vitals signs and nursing note reviewed.   Constitutional:       General: He is not in acute distress.     Appearance: He is well-developed. He is not diaphoretic.   HENT:      Head: Normocephalic and atraumatic.      Right Ear: External ear normal.      Left Ear: External ear normal.      Nose: Nose normal.   Eyes:      General:          Right eye: No discharge.         Left eye: No discharge.      Pupils: Pupils are equal, round, and reactive to light.   Neck:      Musculoskeletal: Normal range of motion and neck supple. No edema or erythema.      Thyroid: No thyromegaly.      Vascular: No carotid bruit.      Trachea: No tracheal deviation.   Cardiovascular:      Rate and Rhythm: Normal rate and regular rhythm.      Heart sounds: Normal heart sounds. No murmur. No friction rub. No gallop.    Pulmonary:      Effort: Pulmonary effort is normal. No respiratory distress.      Breath sounds: Normal breath sounds. No wheezing or rales.   Abdominal:      General: Bowel sounds are normal. There is no distension.      Palpations: Abdomen is soft.      Tenderness: There is no abdominal tenderness.   Musculoskeletal: Normal range of motion.         General: No deformity.   Lymphadenopathy:      Cervical: No cervical adenopathy.   Skin:     General: Skin is warm and dry.      Coloration: Skin is not pale.      Findings: No erythema or rash.   Neurological:      Mental Status: He is alert and oriented to person, place, and time.      Coordination: Coordination normal.   Psychiatric:         Behavior: Behavior normal.         Thought Content: Thought content normal.         Judgment: Judgment normal.       Results for orders placed or performed in visit on 10/06/20   Thyroid Panel With TSH    Specimen: Blood   Result Value Ref Range    TSH 1.070 0.450 - 4.500 uIU/mL    T4, Total 8.6 4.5 - 12.0 ug/dL    T3 Uptake 29 24 - 39 %    Free Thyroxine Index 2.5 1.2 - 4.9   C-Peptide    Specimen: Blood   Result Value Ref Range    C-Peptide 2.2 1.1 - 4.4 ng/mL   Hemoglobin A1c    Specimen: Blood   Result Value Ref Range    Hemoglobin A1C 9.80 (H) 4.80 - 5.60 %   Vitamin D 25 Hydroxy    Specimen: Blood   Result Value Ref Range    25 Hydroxy, Vitamin D 31.5 30.0 - 100.0 ng/ml   MicroAlbumin, Urine, Random - Urine, Clean Catch    Specimen: Urine, Clean Catch   Result Value  Ref Range    Microalbumin, Urine 13.8 Not Estab. ug/mL   Lipid Panel    Specimen: Blood   Result Value Ref Range    Total Cholesterol 148 0 - 200 mg/dL    Triglycerides 153 (H) 0 - 150 mg/dL    HDL Cholesterol 28 (L) 40 - 60 mg/dL    VLDL Cholesterol Javier 30.6 mg/dL    LDL Chol Calc (NIH) 89 0 - 100 mg/dL   Comprehensive Metabolic Panel    Specimen: Blood   Result Value Ref Range    Glucose 129 (H) 65 - 99 mg/dL    BUN 19 6 - 20 mg/dL    Creatinine 0.88 0.76 - 1.27 mg/dL    eGFR Non African Am 94 >60 mL/min/1.73    eGFR African Am 114 >60 mL/min/1.73    BUN/Creatinine Ratio 21.6 7.0 - 25.0    Sodium 140 136 - 145 mmol/L    Potassium 4.3 3.5 - 5.2 mmol/L    Chloride 102 98 - 107 mmol/L    Total CO2 24.7 22.0 - 29.0 mmol/L    Calcium 9.6 8.6 - 10.5 mg/dL    Total Protein 6.6 6.0 - 8.5 g/dL    Albumin 4.60 3.50 - 5.20 g/dL    Globulin 2.0 gm/dL    A/G Ratio 2.3 g/dL    Total Bilirubin 0.4 0.0 - 1.2 mg/dL    Alkaline Phosphatase 89 39 - 117 U/L    AST (SGOT) 16 1 - 40 U/L    ALT (SGPT) 25 1 - 41 U/L   Cardiovascular Risk Assessment   Result Value Ref Range    Interpretation Note    Diabetes Patient Education   Result Value Ref Range    PDF Image Not applicable          Assessment/Plan   Problems Addressed this Visit        Cardiovascular and Mediastinum    Essential hypertension       Digestive    Vitamin D deficiency       Endocrine    Uncontrolled type 2 diabetes mellitus with hyperglycemia (CMS/HCC) - Primary    Relevant Medications    Insulin Glargine, 1 Unit Dial, (Toujeo SoloStar) 300 UNIT/ML solution pen-injector injection    Semaglutide,0.25 or 0.5MG/DOS, (Ozempic, 0.25 or 0.5 MG/DOSE,) 2 MG/1.5ML solution pen-injector       Other    Dyslipidemia      Diagnoses       Codes Comments    Uncontrolled type 2 diabetes mellitus with hyperglycemia (CMS/HCC)    -  Primary ICD-10-CM: E11.65  ICD-9-CM: 250.02     Vitamin D deficiency     ICD-10-CM: E55.9  ICD-9-CM: 268.9     Essential hypertension     ICD-10-CM:  I10  ICD-9-CM: 401.9     Dyslipidemia     ICD-10-CM: E78.5  ICD-9-CM: 272.4         Patient was seen and examined.  He is a poor historian regarding his diabetes history as well as his blood sugars.  He reports that he is not currently checking his blood sugars.  We discussed a continuous glucose monitoring sensor to improve his glycemic control.  He was given a sample today of the freestyle miguel angel.  He has been advised to stop soliqua and to continue basal insulin Toujeo 60 units once daily and to start Ozempic 0.25 mg weekly for 4 weeks then increase to 0.5 mg weekly.  He has been advised to titrate his basal insulin by 2 units every 3 days until his morning blood sugars are less than 110 mg/dL.  Have advised him to report any hypoglycemic events to the office.  Has been encouraged to sign up for my chart for easier communication.  Blood pressure is in satisfactory range.  Cholesterol is in satisfactory range on atorvastatin 40 mg.  He is on vitamin D 50,000 units twice weekly.  Blood pressure is in satisfactory range.  He has been advised to follow-up with the endocrinologist his next visit with labs prior.         Stop soliqua  Toujeo 60 units once daily continue to increase by 2 units every 3 days if your morning blood sugars are greater than 110 mg/dL  Ozempic 0.25 mg weekly for 4 weeks then increase to 0.5 mg weekly   Monitor your blood sugars with the sensor and let me know how your blood sugars are doing so that we can make changes to improve your overall glycemic control

## 2021-01-20 ENCOUNTER — TRANSCRIBE ORDERS (OUTPATIENT)
Dept: ADMINISTRATIVE | Facility: HOSPITAL | Age: 46
End: 2021-01-20

## 2021-01-20 DIAGNOSIS — C68.0 URETHRA CANCER (HCC): Primary | ICD-10-CM

## 2021-01-20 DIAGNOSIS — C61 PROSTATE CANCER (HCC): ICD-10-CM

## 2021-01-20 DIAGNOSIS — C67.9 MALIGNANT NEOPLASM OF URINARY BLADDER, UNSPECIFIED SITE (HCC): ICD-10-CM

## 2021-01-26 ENCOUNTER — HOSPITAL ENCOUNTER (OUTPATIENT)
Dept: PET IMAGING | Facility: HOSPITAL | Age: 46
End: 2021-01-26

## 2021-01-26 ENCOUNTER — HOSPITAL ENCOUNTER (OUTPATIENT)
Dept: PET IMAGING | Facility: HOSPITAL | Age: 46
Discharge: HOME | End: 2021-01-26

## 2021-01-26 DIAGNOSIS — C67.9 MALIGNANT NEOPLASM OF URINARY BLADDER, UNSPECIFIED SITE (HCC): ICD-10-CM

## 2021-01-26 DIAGNOSIS — C61 PROSTATE CANCER (HCC): ICD-10-CM

## 2021-01-26 DIAGNOSIS — C68.0 URETHRA CANCER (HCC): ICD-10-CM

## 2021-01-29 ENCOUNTER — HOSPITAL ENCOUNTER (OUTPATIENT)
Dept: PET IMAGING | Facility: HOSPITAL | Age: 46
Discharge: HOME | End: 2021-01-29

## 2021-01-29 LAB — GLUCOSE BLDC GLUCOMTR-MCNC: 142 MG/DL (ref 70–130)

## 2021-01-29 PROCEDURE — 78815 PET IMAGE W/CT SKULL-THIGH: CPT

## 2021-01-29 PROCEDURE — 0 FLUDEOXYGLUCOSE F18 SOLUTION: Performed by: UROLOGY

## 2021-01-29 PROCEDURE — 82962 GLUCOSE BLOOD TEST: CPT

## 2021-01-29 PROCEDURE — A9552 F18 FDG: HCPCS | Performed by: UROLOGY

## 2021-01-29 RX ADMIN — FLUDEOXYGLUCOSE F18 1 DOSE: 300 INJECTION INTRAVENOUS at 08:30

## 2021-03-29 ENCOUNTER — TELEPHONE (OUTPATIENT)
Dept: GASTROENTEROLOGY | Facility: CLINIC | Age: 46
End: 2021-03-29

## 2021-03-29 DIAGNOSIS — R93.3 ABNORMAL COLONOSCOPY: Primary | ICD-10-CM

## 2021-03-30 PROBLEM — R93.3 ABNORMAL COLONOSCOPY: Status: ACTIVE | Noted: 2021-03-30

## 2021-03-31 ENCOUNTER — BULK ORDERING (OUTPATIENT)
Dept: CASE MANAGEMENT | Facility: OTHER | Age: 46
End: 2021-03-31

## 2021-03-31 DIAGNOSIS — Z23 IMMUNIZATION DUE: ICD-10-CM

## 2021-04-01 DIAGNOSIS — E11.65 UNCONTROLLED TYPE 2 DIABETES MELLITUS WITH HYPERGLYCEMIA (HCC): Primary | ICD-10-CM

## 2021-04-01 DIAGNOSIS — E78.5 DYSLIPIDEMIA: ICD-10-CM

## 2021-04-01 DIAGNOSIS — I10 ESSENTIAL HYPERTENSION: ICD-10-CM

## 2021-04-01 RX ORDER — SEMAGLUTIDE 1.34 MG/ML
0.5 INJECTION, SOLUTION SUBCUTANEOUS WEEKLY
Qty: 4.5 ML | Refills: 0 | Status: SHIPPED | OUTPATIENT
Start: 2021-04-01 | End: 2021-07-27 | Stop reason: SDUPTHER

## 2021-04-06 RX ORDER — FLASH GLUCOSE SENSOR
KIT MISCELLANEOUS
Qty: 2 EACH | Refills: 0 | Status: SHIPPED | OUTPATIENT
Start: 2021-04-06 | End: 2021-05-25 | Stop reason: SDUPTHER

## 2021-04-07 LAB
ALBUMIN SERPL-MCNC: 4.7 G/DL (ref 3.5–5.2)
ALBUMIN/GLOB SERPL: 1.8 G/DL
ALP SERPL-CCNC: 102 U/L (ref 39–117)
ALT SERPL-CCNC: 25 U/L (ref 1–41)
AST SERPL-CCNC: 16 U/L (ref 1–40)
BILIRUB SERPL-MCNC: 0.7 MG/DL (ref 0–1.2)
BUN SERPL-MCNC: 15 MG/DL (ref 6–20)
BUN/CREAT SERPL: 16.7 (ref 7–25)
CALCIUM SERPL-MCNC: 9.9 MG/DL (ref 8.6–10.5)
CHLORIDE SERPL-SCNC: 104 MMOL/L (ref 98–107)
CHOLEST SERPL-MCNC: 127 MG/DL (ref 0–200)
CO2 SERPL-SCNC: 29.5 MMOL/L (ref 22–29)
CREAT SERPL-MCNC: 0.9 MG/DL (ref 0.76–1.27)
GLOBULIN SER CALC-MCNC: 2.6 GM/DL
GLUCOSE SERPL-MCNC: 96 MG/DL (ref 65–99)
HBA1C MFR BLD: 6.7 % (ref 4.8–5.6)
HDLC SERPL-MCNC: 34 MG/DL (ref 40–60)
IMP & REVIEW OF LAB RESULTS: NORMAL
LDLC SERPL CALC-MCNC: 78 MG/DL (ref 0–100)
Lab: NORMAL
POTASSIUM SERPL-SCNC: 4.2 MMOL/L (ref 3.5–5.2)
PROT SERPL-MCNC: 7.3 G/DL (ref 6–8.5)
SODIUM SERPL-SCNC: 145 MMOL/L (ref 136–145)
TRIGL SERPL-MCNC: 73 MG/DL (ref 0–150)
TSH SERPL DL<=0.005 MIU/L-ACNC: 0.88 UIU/ML (ref 0.27–4.2)
VLDLC SERPL CALC-MCNC: 15 MG/DL (ref 5–40)

## 2021-04-08 ENCOUNTER — TRANSCRIBE ORDERS (OUTPATIENT)
Dept: SLEEP MEDICINE | Facility: HOSPITAL | Age: 46
End: 2021-04-08

## 2021-04-08 DIAGNOSIS — Z01.818 OTHER SPECIFIED PRE-OPERATIVE EXAMINATION: Primary | ICD-10-CM

## 2021-04-12 NOTE — PROGRESS NOTES
Subjective   Gonzalo Mcduffie is a 45 y.o. male.     F/u from [facility] for  Dm 2, vitamin d def, hypertension, hyperlipidemia / pt using the freestyle miguel angel to test bs  / last dm eye exam dec 2020  / last dm foot exam today with dr Coker     Patient is a 45-year-old male came in for follow-up.  He is new to me.    He has known diabetes mellitus since 2002 and started on insulin in 2016.  He is on Toujeo 60 units every morning, Ozempic 0.5 mg weekly, and Xigduo XR 5/1000 mg 2 tablets once a day.  He uses a freestyle miguel angel sensor and blood sugars have improved since.  Hemoglobin A1c done in April 2021 is 6.7%.  He denies significant weight change.    His last eye examination was in December 2020.  He denies retinopathy or nephropathy.  He denies numbness, tingling or burning in his hands or feet.    He has hyperlipidemia is on Lipitor 40 mg/day.  He denies myalgia.  Lipid panel done in April 2021 are as follows cholesterol 127.  HDL 34.  LDL 78.  Triglycerides 73.    He has hypertension is on losartan 25 mg once a day he has no previous history of heart attack or stroke.  He denies chest pain or shortness of breath.      He has history of vitamin D deficiency and is on ergocalciferol 50,000 units twice a week for more than a year.  He denies history of diarrhea.    He has history of kidney stones and had previous lithotripsies.  He had surgery for urethral cancer done by Dr. Nair.    He had multiple colon polyps removed in July 2020 by Dr. Brito.  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  He is scheduled for follow-up colonoscopy next month.  He denies bowel complaints.  His father had colon cancer in his 70s.    He had 2 Pfizer Covid vaccines without major side effects.  The following portions of the patient's history were reviewed and updated as appropriate: allergies, current medications, past family history, past medical history, past social history, past surgical history and problem  "list.    Review of Systems   Respiratory: Negative for shortness of breath.    Cardiovascular: Negative for chest pain and palpitations.   Gastrointestinal: Negative.    Genitourinary: Negative for difficulty urinating, dysuria and hematuria.   Musculoskeletal: Negative for myalgias.   Neurological: Negative for numbness.     Objective      Vitals:    04/20/21 1158   BP: 104/62   BP Location: Left arm   Patient Position: Sitting   Cuff Size: Large Adult   Pulse: 73   SpO2: 98%   Weight: 82.2 kg (181 lb 3.2 oz)   Height: 182.9 cm (72.01\")     Physical Exam  Constitutional:       General: He is not in acute distress.     Appearance: Normal appearance. He is obese. He is not ill-appearing, toxic-appearing or diaphoretic.   HENT:      Mouth/Throat:      Mouth: Mucous membranes are moist.      Pharynx: No oropharyngeal exudate or posterior oropharyngeal erythema.   Eyes:      General: No scleral icterus.        Right eye: No discharge.         Left eye: No discharge.      Extraocular Movements: Extraocular movements intact.      Conjunctiva/sclera: Conjunctivae normal.   Neck:      Vascular: No carotid bruit.   Cardiovascular:      Rate and Rhythm: Normal rate and regular rhythm.      Pulses: Normal pulses.      Heart sounds: Normal heart sounds. No murmur heard.   No friction rub. No gallop.    Pulmonary:      Effort: Pulmonary effort is normal. No respiratory distress.      Breath sounds: Normal breath sounds. No stridor. No wheezing, rhonchi or rales.   Chest:      Chest wall: No tenderness.   Abdominal:      General: Bowel sounds are normal. There is no distension.      Palpations: Abdomen is soft. There is no mass.      Tenderness: There is no abdominal tenderness. There is no right CVA tenderness or left CVA tenderness.      Hernia: No hernia is present.   Musculoskeletal:         General: No swelling or tenderness. Normal range of motion.      Cervical back: Normal range of motion and neck supple. No rigidity or " tenderness.   Lymphadenopathy:      Cervical: No cervical adenopathy.   Skin:     General: Skin is warm and dry.   Neurological:      General: No focal deficit present.      Mental Status: He is alert and oriented to person, place, and time.   Psychiatric:         Mood and Affect: Mood normal.         Behavior: Behavior normal.       Orders Only on 04/06/2021   Component Date Value Ref Range Status   • Glucose 04/06/2021 96  65 - 99 mg/dL Final   • BUN 04/06/2021 15  6 - 20 mg/dL Final   • Creatinine 04/06/2021 0.90  0.76 - 1.27 mg/dL Final   • eGFR Non African Am 04/06/2021 91  >60 mL/min/1.73 Final    Comment: GFR Normal >60  Chronic Kidney Disease <60  Kidney Failure <15     • eGFR  Am 04/06/2021 110  >60 mL/min/1.73 Final   • BUN/Creatinine Ratio 04/06/2021 16.7  7.0 - 25.0 Final   • Sodium 04/06/2021 145  136 - 145 mmol/L Final   • Potassium 04/06/2021 4.2  3.5 - 5.2 mmol/L Final   • Chloride 04/06/2021 104  98 - 107 mmol/L Final   • Total CO2 04/06/2021 29.5* 22.0 - 29.0 mmol/L Final   • Calcium 04/06/2021 9.9  8.6 - 10.5 mg/dL Final   • Total Protein 04/06/2021 7.3  6.0 - 8.5 g/dL Final   • Albumin 04/06/2021 4.70  3.50 - 5.20 g/dL Final   • Globulin 04/06/2021 2.6  gm/dL Final   • A/G Ratio 04/06/2021 1.8  g/dL Final   • Total Bilirubin 04/06/2021 0.7  0.0 - 1.2 mg/dL Final   • Alkaline Phosphatase 04/06/2021 102  39 - 117 U/L Final   • AST (SGOT) 04/06/2021 16  1 - 40 U/L Final   • ALT (SGPT) 04/06/2021 25  1 - 41 U/L Final   • Total Cholesterol 04/06/2021 127  0 - 200 mg/dL Final    Comment: Cholesterol Reference Ranges  (U.S. Department of Health and Human Services ATP III  Classifications)  Desirable          <200 mg/dL  Borderline High    200-239 mg/dL  High Risk          >240 mg/dL  Triglyceride Reference Ranges  (U.S. Department of Health and Human Services ATP III  Classifications)  Normal           <150 mg/dL  Borderline High  150-199 mg/dL  High             200-499 mg/dL  Very High         >500 mg/dL  HDL Reference Ranges  (U.S. Department of Health and Human Services ATP III  Classifcations)  Low     <40 mg/dl (major risk factor for CHD)  High    >60 mg/dl ('negative' risk factor for CHD)  LDL Reference Ranges  (U.S. Department of Health and Human Services ATP III  Classifcations)  Optimal          <100 mg/dL  Near Optimal     100-129 mg/dL  Borderline High  130-159 mg/dL  High             160-189 mg/dL  Very High        >189 mg/dL     • Triglycerides 04/06/2021 73  0 - 150 mg/dL Final   • HDL Cholesterol 04/06/2021 34* 40 - 60 mg/dL Final   • VLDL Cholesterol Javier 04/06/2021 15  5 - 40 mg/dL Final   • LDL Chol Calc ([facility]) 04/06/2021 78  0 - 100 mg/dL Final   • Hemoglobin A1C 04/06/2021 6.70* 4.80 - 5.60 % Final    Comment: Hemoglobin A1C Ranges:  Increased Risk for Diabetes  5.7% to 6.4%  Diabetes                     >= 6.5%  Diabetic Goal                < 7.0%     • TSH 04/06/2021 0.878  0.270 - 4.200 uIU/mL Final   • Interpretation 04/06/2021 Note   Final    Supplemental report is available.   • PDF Image 04/06/2021 Not applicable   Final     Assessment/Plan   Diagnoses and all orders for this visit:    1. Type 2 diabetes mellitus without complication, with long-term current use of insulin (CMS/[facility]) (Primary)    2. Essential hypertension    3. Vitamin D deficiency    4. Other hyperlipidemia    5. Personal history of kidney stones      Continue Toujeo 60 units every morning, Ozempic 0.5 mg weekly, and Xigduo XR 5/1000 mg 2 tablets once a day.  Continue Lipitor 40 mg/day.  Continue losartan 25 mg/day.  Continue vitamin D 50,000 units twice a week.  Follow with Dr. Brito and Dr. Nair as scheduled.    RTC 4 mos.    Copy of my note sent to Dr. Ramirez, Dr. Brito and Dr. Nair

## 2021-04-16 NOTE — TELEPHONE ENCOUNTER
Last visit yaakov Fajardo on 10/20/2020  Next visit on 04/20/2021  Last filled by historic provider

## 2021-04-17 RX ORDER — INSULIN GLARGINE 300 U/ML
60 INJECTION, SOLUTION SUBCUTANEOUS DAILY
Qty: 18 ML | Refills: 1 | Status: SHIPPED | OUTPATIENT
Start: 2021-04-17 | End: 2021-11-05

## 2021-04-17 RX ORDER — ATORVASTATIN CALCIUM 40 MG/1
40 TABLET, FILM COATED ORAL DAILY
Qty: 90 TABLET | Refills: 1 | Status: SHIPPED | OUTPATIENT
Start: 2021-04-17 | End: 2021-08-04

## 2021-04-20 ENCOUNTER — OFFICE VISIT (OUTPATIENT)
Dept: ENDOCRINOLOGY | Age: 46
End: 2021-04-20

## 2021-04-20 VITALS
SYSTOLIC BLOOD PRESSURE: 104 MMHG | HEIGHT: 72 IN | OXYGEN SATURATION: 98 % | BODY MASS INDEX: 24.54 KG/M2 | WEIGHT: 181.2 LBS | DIASTOLIC BLOOD PRESSURE: 62 MMHG | HEART RATE: 73 BPM

## 2021-04-20 DIAGNOSIS — E78.49 OTHER HYPERLIPIDEMIA: ICD-10-CM

## 2021-04-20 DIAGNOSIS — Z87.442 PERSONAL HISTORY OF KIDNEY STONES: ICD-10-CM

## 2021-04-20 DIAGNOSIS — I10 ESSENTIAL HYPERTENSION: ICD-10-CM

## 2021-04-20 DIAGNOSIS — E11.9 TYPE 2 DIABETES MELLITUS WITHOUT COMPLICATION, WITH LONG-TERM CURRENT USE OF INSULIN (HCC): Primary | ICD-10-CM

## 2021-04-20 DIAGNOSIS — Z79.4 TYPE 2 DIABETES MELLITUS WITHOUT COMPLICATION, WITH LONG-TERM CURRENT USE OF INSULIN (HCC): Primary | ICD-10-CM

## 2021-04-20 DIAGNOSIS — E55.9 VITAMIN D DEFICIENCY: ICD-10-CM

## 2021-04-20 PROCEDURE — 99214 OFFICE O/P EST MOD 30 MIN: CPT | Performed by: INTERNAL MEDICINE

## 2021-04-21 LAB
ALBUMIN/CREAT UR: 43 MG/G CREAT (ref 0–29)
APPEARANCE UR: CLEAR
BACTERIA #/AREA URNS HPF: ABNORMAL /HPF
BILIRUB UR QL STRIP: NEGATIVE
CASTS URNS MICRO: ABNORMAL
COLOR UR: YELLOW
CREAT UR-MCNC: 108.5 MG/DL
EPI CELLS #/AREA URNS HPF: ABNORMAL /HPF
GLUCOSE UR QL: ABNORMAL
HGB UR QL STRIP: ABNORMAL
KETONES UR QL STRIP: NEGATIVE
LEUKOCYTE ESTERASE UR QL STRIP: ABNORMAL
MICROALBUMIN UR-MCNC: 46.9 UG/ML
NITRITE UR QL STRIP: NEGATIVE
PH UR STRIP: 5.5 [PH] (ref 5–8)
PROT UR QL STRIP: ABNORMAL
RBC #/AREA URNS HPF: ABNORMAL /HPF
SP GR UR: 1.02 (ref 1–1.03)
UROBILINOGEN UR STRIP-MCNC: ABNORMAL MG/DL
WBC #/AREA URNS HPF: ABNORMAL /HPF

## 2021-04-25 RX ORDER — CIPROFLOXACIN 500 MG/1
500 TABLET, FILM COATED ORAL 2 TIMES DAILY
Qty: 14 TABLET | Refills: 0 | Status: SHIPPED | OUTPATIENT
Start: 2021-04-25 | End: 2021-11-17

## 2021-05-01 ENCOUNTER — LAB (OUTPATIENT)
Dept: LAB | Facility: HOSPITAL | Age: 46
End: 2021-05-01

## 2021-05-01 DIAGNOSIS — Z01.818 OTHER SPECIFIED PRE-OPERATIVE EXAMINATION: ICD-10-CM

## 2021-05-01 PROCEDURE — U0004 COV-19 TEST NON-CDC HGH THRU: HCPCS

## 2021-05-01 PROCEDURE — C9803 HOPD COVID-19 SPEC COLLECT: HCPCS

## 2021-05-03 LAB — SARS-COV-2 RNA RESP QL NAA+PROBE: NOT DETECTED

## 2021-05-04 ENCOUNTER — ANESTHESIA (OUTPATIENT)
Dept: GASTROENTEROLOGY | Facility: HOSPITAL | Age: 46
End: 2021-05-04

## 2021-05-04 ENCOUNTER — HOSPITAL ENCOUNTER (OUTPATIENT)
Facility: HOSPITAL | Age: 46
Setting detail: HOSPITAL OUTPATIENT SURGERY
Discharge: HOME | End: 2021-05-04
Attending: INTERNAL MEDICINE | Admitting: INTERNAL MEDICINE

## 2021-05-04 ENCOUNTER — ANESTHESIA EVENT (OUTPATIENT)
Dept: GASTROENTEROLOGY | Facility: HOSPITAL | Age: 46
End: 2021-05-04

## 2021-05-04 VITALS
DIASTOLIC BLOOD PRESSURE: 68 MMHG | SYSTOLIC BLOOD PRESSURE: 101 MMHG | TEMPERATURE: 98.7 F | OXYGEN SATURATION: 99 % | WEIGHT: 171 LBS | BODY MASS INDEX: 22.66 KG/M2 | RESPIRATION RATE: 16 BRPM | HEART RATE: 81 BPM | HEIGHT: 73 IN

## 2021-05-04 PROCEDURE — 45378 DIAGNOSTIC COLONOSCOPY: CPT | Performed by: INTERNAL MEDICINE

## 2021-05-04 PROCEDURE — 25010000002 PHENYLEPHRINE PER 1 ML: Performed by: ANESTHESIOLOGY

## 2021-05-04 PROCEDURE — 25010000002 PROPOFOL 10 MG/ML EMULSION: Performed by: ANESTHESIOLOGY

## 2021-05-04 PROCEDURE — S0260 H&P FOR SURGERY: HCPCS | Performed by: INTERNAL MEDICINE

## 2021-05-04 RX ORDER — SODIUM CHLORIDE 0.9 % (FLUSH) 0.9 %
10 SYRINGE (ML) INJECTION AS NEEDED
Status: DISCONTINUED | OUTPATIENT
Start: 2021-05-04 | End: 2021-05-04 | Stop reason: HOSPADM

## 2021-05-04 RX ORDER — SODIUM CHLORIDE, SODIUM LACTATE, POTASSIUM CHLORIDE, CALCIUM CHLORIDE 600; 310; 30; 20 MG/100ML; MG/100ML; MG/100ML; MG/100ML
1000 INJECTION, SOLUTION INTRAVENOUS CONTINUOUS
Status: DISCONTINUED | OUTPATIENT
Start: 2021-05-04 | End: 2021-05-04 | Stop reason: HOSPADM

## 2021-05-04 RX ORDER — SODIUM CHLORIDE, SODIUM LACTATE, POTASSIUM CHLORIDE, CALCIUM CHLORIDE 600; 310; 30; 20 MG/100ML; MG/100ML; MG/100ML; MG/100ML
INJECTION, SOLUTION INTRAVENOUS CONTINUOUS PRN
Status: DISCONTINUED | OUTPATIENT
Start: 2021-05-04 | End: 2021-05-04 | Stop reason: SURG

## 2021-05-04 RX ORDER — LIDOCAINE HYDROCHLORIDE 20 MG/ML
INJECTION, SOLUTION INFILTRATION; PERINEURAL AS NEEDED
Status: DISCONTINUED | OUTPATIENT
Start: 2021-05-04 | End: 2021-05-04 | Stop reason: SURG

## 2021-05-04 RX ORDER — PROPOFOL 10 MG/ML
VIAL (ML) INTRAVENOUS AS NEEDED
Status: DISCONTINUED | OUTPATIENT
Start: 2021-05-04 | End: 2021-05-04 | Stop reason: SURG

## 2021-05-04 RX ORDER — PROPOFOL 10 MG/ML
VIAL (ML) INTRAVENOUS CONTINUOUS PRN
Status: DISCONTINUED | OUTPATIENT
Start: 2021-05-04 | End: 2021-05-04 | Stop reason: SURG

## 2021-05-04 RX ADMIN — PROPOFOL 100 MG: 10 INJECTION, EMULSION INTRAVENOUS at 13:43

## 2021-05-04 RX ADMIN — PHENYLEPHRINE HYDROCHLORIDE 100 MCG: 10 INJECTION INTRAVENOUS at 13:53

## 2021-05-04 RX ADMIN — PHENYLEPHRINE HYDROCHLORIDE 100 MCG: 10 INJECTION INTRAVENOUS at 13:48

## 2021-05-04 RX ADMIN — LIDOCAINE HYDROCHLORIDE 60 MG: 20 INJECTION, SOLUTION INFILTRATION; PERINEURAL at 13:42

## 2021-05-04 RX ADMIN — SODIUM CHLORIDE, POTASSIUM CHLORIDE, SODIUM LACTATE AND CALCIUM CHLORIDE: 600; 310; 30; 20 INJECTION, SOLUTION INTRAVENOUS at 13:40

## 2021-05-04 RX ADMIN — PROPOFOL 160 MCG/KG/MIN: 10 INJECTION, EMULSION INTRAVENOUS at 13:43

## 2021-05-04 RX ADMIN — SODIUM CHLORIDE, POTASSIUM CHLORIDE, SODIUM LACTATE AND CALCIUM CHLORIDE 1000 ML: 600; 310; 30; 20 INJECTION, SOLUTION INTRAVENOUS at 13:39

## 2021-05-04 NOTE — H&P
[facility]  Pre Procedure History & Physical    Chief Complaint:   Time for my colonoscopy    Subjective     HPI:   45 y.o. male     Past Medical History:   Past Medical History:   Diagnosis Date   • Diabetes (CMS/HCC)    • High cholesterol    • History of kidney stones    • HTN (hypertension)    • Kidney disease    • Type 2 diabetes mellitus (CMS/HCC)    • Urethral cancer (CMS/HCC)    • Urethral stricture        Family History:  Family History   Problem Relation Age of Onset   • Hypertension Mother    • Heart disease Mother    • Diabetes Mother    • Diabetes Father    • JOSE disease Father    • Heart disease Father    • Diabetes Sister    • Cancer Brother    • Malig Hyperthermia Neg Hx        Social History:   reports that he has never smoked. He has never used smokeless tobacco. He reports that he does not drink alcohol and does not use drugs.    Medications:   Medications Prior to Admission   Medication Sig Dispense Refill Last Dose   • aspirin 81 MG tablet Take 81 mg by mouth Daily.   5/3/2021 at Unknown time   • atorvastatin (LIPITOR) 40 MG tablet Take 1 tablet by mouth Daily. 90 tablet 1 5/3/2021 at Unknown time   • B-D ULTRAFINE III SHORT PEN 31G X 8 MM misc USE TO INJECT INSULIN ONCE DAILY 100 each 2 5/3/2021 at Unknown time   • ciprofloxacin (CIPRO) 500 MG tablet Take 1 tablet by mouth 2 (Two) Times a Day. 14 tablet 0 5/3/2021 at Unknown time   • Continuous Blood Gluc Sensor (FreeStyle [place] 14 Day Sensor) misc 1 EACH EVERY 14 (FOURTEEN) DAYS. 2 each 0 5/4/2021 at Unknown time   • Insulin Glargine, 1 Unit Dial, (Toujeo SoloStar) 300 UNIT/ML solution pen-injector injection Inject 60 Units under the skin into the appropriate area as directed Daily. 18 mL 1 5/3/2021 at Unknown time   • losartan (COZAAR) 25 MG tablet Take 25 mg by mouth Daily.   5/3/2021 at Unknown time   • ONE TOUCH ULTRA TEST test strip Check blood sugars PRN 50 each 4 5/3/2021 at Unknown time   • Ozempic, 0.25 or 0.5  "MG/DOSE, 2 MG/1.5ML solution pen-injector INJECT 0.5 MG UNDER THE SKIN INTO THE APPROPRIATE AREA AS DIRECTED 1 (ONE) TIME PER WEEK. 4.5 mL 0 Past Week at Unknown time   • tamsulosin (FLOMAX) 0.4 MG capsule 24 hr capsule Take 1 capsule by mouth Daily.   5/3/2021 at Unknown time   • vitamin D (ERGOCALCIFEROL) 44773 units capsule capsule Take 1 capsule by mouth 2 (Two) Times a Week. 24 capsule 1 5/3/2021 at Unknown time   • XIGDUO XR 5-1000 MG tablet TAKE 2 TABLETS BY MOUTH EVERY DAY 60 tablet 3 5/3/2021 at Unknown time       Allergies:  Patient has no known allergies.    ROS:    Pertinent items are noted in HPI     Objective     Blood pressure 138/83, pulse 92, temperature 98.7 °F (37.1 °C), resp. rate 20, height 185.4 cm (73\"), weight 77.6 kg (171 lb), SpO2 98 %.    Physical Exam   Constitutional: Pt is oriented to person, place, and time and well-developed, well-nourished, and in no distress.   HENT:   Mouth/Throat: Oropharynx is clear and moist.   Neck: Normal range of motion. Neck supple.   Cardiovascular: Normal rate, regular rhythm and normal heart sounds.    Pulmonary/Chest: Effort normal and breath sounds normal. No respiratory distress. No  wheezes.   Abdominal: Soft. Bowel sounds are normal.   Skin: Skin is warm and dry.   Psychiatric: Mood, memory, affect and judgment normal.     Assessment/Plan     Diagnosis:  Encounter for screening for colon cancer    Anticipated Surgical Procedure:  Colonoscopy    The risks, benefits, and alternatives of this procedure have been discussed with the patient or the responsible party- the patient understands and agrees to proceed.                                                                "

## 2021-05-04 NOTE — ANESTHESIA PREPROCEDURE EVALUATION
Anesthesia Evaluation     NPO Solid Status: > 8 hours  NPO Liquid Status: > 2 hours           Airway   Mallampati: II  TM distance: >3 FB  Neck ROM: full  No difficulty expected  Dental - normal exam     Pulmonary - normal exam   Cardiovascular - normal exam  Exercise tolerance: good (4-7 METS)    ECG reviewed    (+) hypertension well controlled less than 2 medications, hyperlipidemia,       Neuro/Psych  GI/Hepatic/Renal/Endo    (+)   renal disease stones, diabetes mellitus type 2 using insulin,     Musculoskeletal     Abdominal    Substance History      OB/GYN          Other      history of cancer                  Anesthesia Plan    ASA 3     MAC     intravenous induction     Anesthetic plan, all risks, benefits, and alternatives have been provided, discussed and informed consent has been obtained with: patient.

## 2021-05-04 NOTE — ANESTHESIA POSTPROCEDURE EVALUATION
"Patient: Gonzalo Mcduffie    Procedure Summary     Date: 05/04/21 Room / Location: [facility] ENDOSCOPY 4 / [facility] ENDOSCOPY    Anesthesia Start: 1340 Anesthesia Stop: 1403    Procedure: COLONOSCOPY to cecum (N/A ) Diagnosis:       Abnormal colonoscopy      (Abnormal colonoscopy [R93.3])    Surgeons: Kishore Brito MD Provider: Yasmin Nevarez MD    Anesthesia Type: MAC ASA Status: 3          Anesthesia Type: MAC    Vitals  Vitals Value Taken Time   /68 05/04/21 1420   Temp 37.1 °C (98.7 °F) 05/04/21 1340   Pulse 81 05/04/21 1420   Resp 16 05/04/21 1420   SpO2 99 % 05/04/21 1420           Post Anesthesia Care and Evaluation    Patient location during evaluation: bedside  Patient participation: complete - patient participated  Level of consciousness: awake  Pain management: adequate  Airway patency: patent  Anesthetic complications: No anesthetic complications    Cardiovascular status: acceptable  Respiratory status: acceptable  Hydration status: acceptable    Comments: /68 (BP Location: Left arm)   Pulse 81   Temp 37.1 °C (98.7 °F) (Oral)   Resp 16   Ht 185.4 cm (73\")   Wt 77.6 kg (171 lb)   SpO2 99%   BMI 22.56 kg/m²       "

## 2021-05-24 RX ORDER — FLASH GLUCOSE SENSOR
KIT MISCELLANEOUS
Qty: 2 EACH | Refills: 0 | Status: CANCELLED | OUTPATIENT
Start: 2021-05-24

## 2021-05-27 RX ORDER — FLASH GLUCOSE SENSOR
1 KIT MISCELLANEOUS
Qty: 2 EACH | Refills: 5 | Status: SHIPPED | OUTPATIENT
Start: 2021-05-27 | End: 2021-11-24

## 2021-07-09 RX ORDER — PEN NEEDLE, DIABETIC 31 GX5/16"
1 NEEDLE, DISPOSABLE MISCELLANEOUS DAILY
Qty: 100 EACH | Refills: 3 | Status: SHIPPED | OUTPATIENT
Start: 2021-07-09 | End: 2023-01-12

## 2021-07-27 RX ORDER — SEMAGLUTIDE 1.34 MG/ML
0.5 INJECTION, SOLUTION SUBCUTANEOUS WEEKLY
Qty: 4.5 ML | Refills: 5 | Status: SHIPPED | OUTPATIENT
Start: 2021-07-27 | End: 2022-05-05 | Stop reason: DRUGHIGH

## 2021-08-04 RX ORDER — ATORVASTATIN CALCIUM 40 MG/1
TABLET, FILM COATED ORAL
Qty: 90 TABLET | Refills: 1 | Status: SHIPPED | OUTPATIENT
Start: 2021-08-04 | End: 2022-01-26

## 2021-11-05 RX ORDER — INSULIN GLARGINE 300 U/ML
60 INJECTION, SOLUTION SUBCUTANEOUS DAILY
Qty: 18 PEN | Refills: 1 | Status: SHIPPED | OUTPATIENT
Start: 2021-11-05 | End: 2022-06-09

## 2021-11-05 NOTE — PROGRESS NOTES
Subjective   Gonzalo Mcduffie is a 46 y.o. male.     F/u for  Dm 2, vitamin d def, hypertension, hyperlipidemia / pt using the freestyle miguel angel to test bs 4-6 x day  / last dm eye exam dec 2020  / last dm foot exam 4/20/21 with dr Coker/ flu vaccine @[facility]         Patient is a 46-year-old male came in for follow-up.      He has known diabetes mellitus since 2002 and started on insulin in 2016.  He is on Toujeo 60 units every morning, Ozempic 0.5 mg weekly, and Xigduo XR 5/1000 mg 2 tablets once a day.    He misses taking Xigduo XR intermittently.  He uses a freestyle miguel angel sensor but he has not been scanning regularly.   He has no significant weight change since April 2021.    Sensor data reviewed.  Average glucose 105 mg per DL.  Time in target 40%.  Time above target 60%.  Time below target 0.  He has been scanning only 1-2 times a day.     His last eye examination was in December 2020.  He denies retinopathy or nephropathy.  He denies numbness, tingling or burning in his hands or feet.     He has hyperlipidemia is on Lipitor 40 mg/day.  He denies myalgia. He misses taking Lipitor regularly.     He has hypertension is on losartan 25 mg once a day.  He has no previous history of heart attack or stroke.  He denies chest pain or shortness of breath.       He has history of vitamin D deficiency and is on ergocalciferol 50,000 units twice a week for more than a year.  He has not been taking ergocalciferol regularly.  He denies history of diarrhea.     He has history of kidney stones and had previous lithotripsies.  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     He had multiple colon polyps removed in July 2020 by Dr. Brito.  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  He had a follow-up colonoscopy in May 2021.  He denies bowel complaints.  His father had colon cancer in his 70s.     He had 3 Pfizer Covid vaccines without major side effects.      The following portions of the patient's  "history were reviewed and updated as appropriate: allergies, current medications, past family history, past medical history, past social history, past surgical history and problem list.    Review of Systems   Eyes: Negative for visual disturbance.   Respiratory: Negative for shortness of breath.    Cardiovascular: Negative for chest pain and palpitations.   Gastrointestinal: Negative.    Endocrine: Negative.    Genitourinary: Negative.    Musculoskeletal: Negative for myalgias.   Neurological: Negative for numbness.     Objective      Vitals:    11/17/21 1027   BP: 104/58   BP Location: Right arm   Patient Position: Sitting   Cuff Size: Large Adult   Pulse: 100   SpO2: 98%   Weight: 82.9 kg (182 lb 12.8 oz)   Height: 185.4 cm (72.99\")     Physical Exam  Constitutional:       General: He is not in acute distress.     Appearance: Normal appearance. He is not ill-appearing, toxic-appearing or diaphoretic.   Eyes:      General: No scleral icterus.        Right eye: No discharge.         Left eye: No discharge.   Cardiovascular:      Rate and Rhythm: Normal rate and regular rhythm.      Heart sounds: Normal heart sounds.   Pulmonary:      Effort: Pulmonary effort is normal. No respiratory distress.      Breath sounds: Normal breath sounds. No stridor. No rales.   Chest:      Chest wall: No tenderness.   Abdominal:      General: Bowel sounds are normal. There is no distension.      Palpations: Abdomen is soft. There is no mass.      Tenderness: There is no right CVA tenderness or left CVA tenderness.   Musculoskeletal:         General: Normal range of motion.   Skin:     General: Skin is warm.      Coloration: Skin is not jaundiced or pale.   Neurological:      General: No focal deficit present.      Mental Status: He is alert and oriented to person, place, and time.      Comments: Intact light touch on lower ext   Psychiatric:         Mood and Affect: Mood normal.         Behavior: Behavior normal.       Lab on " 05/01/2021   Component Date Value Ref Range Status   • SARS-CoV-2 PCR 05/01/2021 Not Detected  Not Detected Final    Nucleic acid specific to SARS-CoV-2 (COVID-19) virus was not detected in  this sample by the TaqPath (TM) COVID-19 Combo Kit.          SARS-CoV-2 (COVID-19) nucleic acid testing performed using [employer] Aptima (R) SARS-CoV-2 Assay or [employer] TaqPath (TM)  COVID-19 Combo Kit as indicated above under Emergency Use Authorization (EUA) from the FDA. Aptima (R) and TaqPath (TM) test performance  characteristics verified by [facility] in accordance with the FDAs Guidance Document (Policy for Diagnostic Tests for Coronavirus Disease-2019  during the Public Health Emergency) issued on March 16, 2020. The laboratory is regulated under CLIA as qualified to perform high-complexity testing  Unless otherwise noted, all testing was performed at [facility], CLIA No. [id number], [place] State Licensee No. [id number]     Assessment/Plan   Diagnoses and all orders for this visit:    1. Type 2 diabetes mellitus without complication, with long-term current use of insulin (HCC) (Primary)  -     Comprehensive Metabolic Panel  -     Lipid Panel  -     Hemoglobin A1c  -     TSH  -     T4, Free    2. Essential hypertension  -     Comprehensive Metabolic Panel    3. Vitamin D deficiency  -     Vitamin D 25 Hydroxy    4. Other hyperlipidemia  -     Comprehensive Metabolic Panel  -     Lipid Panel    5. Noncompliance with medication regimen      Take medications regularly.  Scan sensor several times a day.  Continue losartan 25 mg/day.  Check vitamin D levels.    Copy my note sent to Dr. Ramirez and Dr. Bergman.    RTC 4 mos.

## 2021-11-17 ENCOUNTER — PATIENT ROUNDING (BHMG ONLY) (OUTPATIENT)
Dept: ENDOCRINOLOGY | Age: 46
End: 2021-11-17

## 2021-11-17 ENCOUNTER — OFFICE VISIT (OUTPATIENT)
Dept: ENDOCRINOLOGY | Age: 46
End: 2021-11-17

## 2021-11-17 VITALS
WEIGHT: 182.8 LBS | HEIGHT: 73 IN | BODY MASS INDEX: 24.23 KG/M2 | HEART RATE: 100 BPM | DIASTOLIC BLOOD PRESSURE: 58 MMHG | OXYGEN SATURATION: 98 % | SYSTOLIC BLOOD PRESSURE: 104 MMHG

## 2021-11-17 DIAGNOSIS — I10 ESSENTIAL HYPERTENSION: ICD-10-CM

## 2021-11-17 DIAGNOSIS — E78.49 OTHER HYPERLIPIDEMIA: ICD-10-CM

## 2021-11-17 DIAGNOSIS — Z79.4 TYPE 2 DIABETES MELLITUS WITHOUT COMPLICATION, WITH LONG-TERM CURRENT USE OF INSULIN (HCC): Primary | ICD-10-CM

## 2021-11-17 DIAGNOSIS — Z91.14 NONCOMPLIANCE WITH MEDICATION REGIMEN: ICD-10-CM

## 2021-11-17 DIAGNOSIS — E55.9 VITAMIN D DEFICIENCY: ICD-10-CM

## 2021-11-17 DIAGNOSIS — E11.9 TYPE 2 DIABETES MELLITUS WITHOUT COMPLICATION, WITH LONG-TERM CURRENT USE OF INSULIN (HCC): Primary | ICD-10-CM

## 2021-11-17 PROBLEM — Z91.148 NONCOMPLIANCE WITH MEDICATION REGIMEN: Status: ACTIVE | Noted: 2021-11-17

## 2021-11-17 PROCEDURE — 99214 OFFICE O/P EST MOD 30 MIN: CPT | Performed by: INTERNAL MEDICINE

## 2021-11-17 NOTE — PROGRESS NOTES
November 17, 2021    Logan Mr Gonzalo Mcduffie?    My name is Maria Fernanda Tolbert     I am  The Practice Manager with [facility]  [facility] [address] [address] [phone].    I am rounding in the office to officially welcome you to our practice and ask about your recent visit. Is this a good time to talk? Yes    Tell me about your visit with us. What things went well? I enjoyed my visit. The provider answered all my questions       We're always looking for ways to make our patients' experiences even better. Do you have recommendations on ways we may improve? no    Overall were you satisfied with your first visit to our practice?yes       I appreciate you taking the time to speak with me today. Is there anything else I can do for you? no    Thank you, and have a great day.

## 2021-11-18 LAB
25(OH)D3+25(OH)D2 SERPL-MCNC: 28.5 NG/ML (ref 30–100)
ALBUMIN SERPL-MCNC: 4.7 G/DL (ref 4–5)
ALBUMIN/GLOB SERPL: 1.5 {RATIO} (ref 1.2–2.2)
ALP SERPL-CCNC: 90 IU/L (ref 44–121)
ALT SERPL-CCNC: 40 IU/L (ref 0–44)
AST SERPL-CCNC: 26 IU/L (ref 0–40)
BILIRUB SERPL-MCNC: 0.6 MG/DL (ref 0–1.2)
BUN SERPL-MCNC: 14 MG/DL (ref 6–24)
BUN/CREAT SERPL: 15 (ref 9–20)
CALCIUM SERPL-MCNC: 9.9 MG/DL (ref 8.7–10.2)
CHLORIDE SERPL-SCNC: 105 MMOL/L (ref 96–106)
CHOLEST SERPL-MCNC: 172 MG/DL (ref 100–199)
CO2 SERPL-SCNC: 23 MMOL/L (ref 20–29)
CREAT SERPL-MCNC: 0.96 MG/DL (ref 0.76–1.27)
GLOBULIN SER CALC-MCNC: 3.1 G/DL (ref 1.5–4.5)
GLUCOSE SERPL-MCNC: 140 MG/DL (ref 65–99)
HBA1C MFR BLD: 8.2 % (ref 4.8–5.6)
HDLC SERPL-MCNC: 33 MG/DL
IMP & REVIEW OF LAB RESULTS: NORMAL
LDLC SERPL CALC-MCNC: 122 MG/DL (ref 0–99)
POTASSIUM SERPL-SCNC: 4.2 MMOL/L (ref 3.5–5.2)
PROT SERPL-MCNC: 7.8 G/DL (ref 6–8.5)
SODIUM SERPL-SCNC: 145 MMOL/L (ref 134–144)
T4 FREE SERPL-MCNC: 1.32 NG/DL (ref 0.82–1.77)
TRIGL SERPL-MCNC: 93 MG/DL (ref 0–149)
TSH SERPL DL<=0.005 MIU/L-ACNC: 0.45 UIU/ML (ref 0.45–4.5)
VLDLC SERPL CALC-MCNC: 17 MG/DL (ref 5–40)

## 2021-11-18 RX ORDER — ACETAMINOPHEN 160 MG
2000 TABLET,DISINTEGRATING ORAL DAILY
Qty: 90 CAPSULE | Refills: 2 | Status: SHIPPED | OUTPATIENT
Start: 2021-11-18 | End: 2022-11-18

## 2021-11-18 NOTE — PROGRESS NOTES
LDL higher at 122.  HDL 33.  Patient has not been taking Lipitor 40 mg regularly.Hemoglobin A1c higher at 8.2%.  Patient has not been taking Xigduo XR regularly.Normal thyroid function tests.Vitamin D insufficient at 28.5 ng/mL.  Discontinue ergocalciferol.  Start vitamin D3 2000 units/day.  Prescription sent to pharmacistSend copy of labs to Dr. Ramirez.  Copy of labs sent to patient through my chart.

## 2021-11-24 RX ORDER — FLASH GLUCOSE SENSOR
1 KIT MISCELLANEOUS
Qty: 2 EACH | Refills: 5 | Status: SHIPPED | OUTPATIENT
Start: 2021-11-24 | End: 2022-06-13

## 2022-01-26 RX ORDER — ATORVASTATIN CALCIUM 40 MG/1
TABLET, FILM COATED ORAL
Qty: 90 TABLET | Refills: 1 | Status: SHIPPED | OUTPATIENT
Start: 2022-01-26 | End: 2022-07-14 | Stop reason: SDUPTHER

## 2022-04-15 DIAGNOSIS — E55.9 VITAMIN D DEFICIENCY: ICD-10-CM

## 2022-04-15 DIAGNOSIS — E11.9 TYPE 2 DIABETES MELLITUS WITHOUT COMPLICATION, WITH LONG-TERM CURRENT USE OF INSULIN: Primary | ICD-10-CM

## 2022-04-15 DIAGNOSIS — E78.49 OTHER HYPERLIPIDEMIA: ICD-10-CM

## 2022-04-15 DIAGNOSIS — I10 ESSENTIAL HYPERTENSION: ICD-10-CM

## 2022-04-15 DIAGNOSIS — Z79.4 TYPE 2 DIABETES MELLITUS WITHOUT COMPLICATION, WITH LONG-TERM CURRENT USE OF INSULIN: Primary | ICD-10-CM

## 2022-04-21 ENCOUNTER — LAB (OUTPATIENT)
Dept: ENDOCRINOLOGY | Age: 47
End: 2022-04-21

## 2022-04-21 DIAGNOSIS — I10 ESSENTIAL HYPERTENSION: ICD-10-CM

## 2022-04-21 DIAGNOSIS — E55.9 VITAMIN D DEFICIENCY: ICD-10-CM

## 2022-04-21 DIAGNOSIS — Z79.4 TYPE 2 DIABETES MELLITUS WITHOUT COMPLICATION, WITH LONG-TERM CURRENT USE OF INSULIN: ICD-10-CM

## 2022-04-21 DIAGNOSIS — E78.49 OTHER HYPERLIPIDEMIA: ICD-10-CM

## 2022-04-21 DIAGNOSIS — E11.9 TYPE 2 DIABETES MELLITUS WITHOUT COMPLICATION, WITH LONG-TERM CURRENT USE OF INSULIN: ICD-10-CM

## 2022-04-22 LAB
25(OH)D3+25(OH)D2 SERPL-MCNC: 40 NG/ML (ref 30–100)
ALBUMIN SERPL-MCNC: 4.8 G/DL (ref 4–5)
ALBUMIN/CREAT UR: 38 MG/G CREAT (ref 0–29)
ALBUMIN/GLOB SERPL: 1.8 {RATIO} (ref 1.2–2.2)
ALP SERPL-CCNC: 92 IU/L (ref 44–121)
ALT SERPL-CCNC: 31 IU/L (ref 0–44)
AST SERPL-CCNC: 25 IU/L (ref 0–40)
BILIRUB SERPL-MCNC: 0.8 MG/DL (ref 0–1.2)
BUN SERPL-MCNC: 14 MG/DL (ref 6–24)
BUN/CREAT SERPL: 14 (ref 9–20)
CALCIUM SERPL-MCNC: 9.9 MG/DL (ref 8.7–10.2)
CHLORIDE SERPL-SCNC: 100 MMOL/L (ref 96–106)
CHOLEST SERPL-MCNC: 131 MG/DL (ref 100–199)
CO2 SERPL-SCNC: 25 MMOL/L (ref 20–29)
CREAT SERPL-MCNC: 0.98 MG/DL (ref 0.76–1.27)
CREAT UR-MCNC: 64 MG/DL
EGFRCR SERPLBLD CKD-EPI 2021: 96 ML/MIN/1.73
GLOBULIN SER CALC-MCNC: 2.6 G/DL (ref 1.5–4.5)
GLUCOSE SERPL-MCNC: 107 MG/DL (ref 65–99)
HBA1C MFR BLD: 7.6 % (ref 4.8–5.6)
HDLC SERPL-MCNC: 33 MG/DL
IMP & REVIEW OF LAB RESULTS: NORMAL
LDLC SERPL CALC-MCNC: 83 MG/DL (ref 0–99)
MICROALBUMIN UR-MCNC: 24.4 UG/ML
POTASSIUM SERPL-SCNC: 4.4 MMOL/L (ref 3.5–5.2)
PROT SERPL-MCNC: 7.4 G/DL (ref 6–8.5)
SODIUM SERPL-SCNC: 143 MMOL/L (ref 134–144)
T4 FREE SERPL-MCNC: 1.31 NG/DL (ref 0.82–1.77)
TRIGL SERPL-MCNC: 75 MG/DL (ref 0–149)
TSH SERPL DL<=0.005 MIU/L-ACNC: 1.14 UIU/ML (ref 0.45–4.5)
VLDLC SERPL CALC-MCNC: 15 MG/DL (ref 5–40)

## 2022-05-05 ENCOUNTER — OFFICE VISIT (OUTPATIENT)
Dept: ENDOCRINOLOGY | Age: 47
End: 2022-05-05

## 2022-05-05 VITALS
WEIGHT: 177.2 LBS | OXYGEN SATURATION: 99 % | SYSTOLIC BLOOD PRESSURE: 128 MMHG | DIASTOLIC BLOOD PRESSURE: 78 MMHG | BODY MASS INDEX: 24.81 KG/M2 | HEIGHT: 71 IN | HEART RATE: 84 BPM

## 2022-05-05 DIAGNOSIS — E78.49 OTHER HYPERLIPIDEMIA: ICD-10-CM

## 2022-05-05 DIAGNOSIS — Z79.4 TYPE 2 DIABETES MELLITUS WITHOUT COMPLICATION, WITH LONG-TERM CURRENT USE OF INSULIN: Primary | ICD-10-CM

## 2022-05-05 DIAGNOSIS — E11.9 TYPE 2 DIABETES MELLITUS WITHOUT COMPLICATION, WITH LONG-TERM CURRENT USE OF INSULIN: Primary | ICD-10-CM

## 2022-05-05 DIAGNOSIS — I10 ESSENTIAL HYPERTENSION: ICD-10-CM

## 2022-05-05 DIAGNOSIS — E55.9 VITAMIN D DEFICIENCY: ICD-10-CM

## 2022-05-05 PROCEDURE — 99214 OFFICE O/P EST MOD 30 MIN: CPT | Performed by: NURSE PRACTITIONER

## 2022-05-05 RX ORDER — SEMAGLUTIDE 1.34 MG/ML
1 INJECTION, SOLUTION SUBCUTANEOUS WEEKLY
Qty: 9 ML | Refills: 1 | Status: SHIPPED | OUTPATIENT
Start: 2022-05-05 | End: 2022-09-07 | Stop reason: SDUPTHER

## 2022-05-05 NOTE — PROGRESS NOTES
Chief Complaint  Chief Complaint   Patient presents with   • Diabetes     Type 2:        Subjective          History of Present Illness    Gonzalo Mcduffie 46 y.o. presents for a follow-up evaluation for type 2 DM    He has been diabetic since 2002 and started insulin in 2016    Pt is on the following medications for their DM: Toujeo 60 units every morning, Ozempic 0.5 mg weekly and Xigduo XR 5/1000 mg 2 tablets daily.     Taking meds regularly.  Missing Xigduo 1-2 times a week.  He is not wearing Clifton on regular basis and when he is not wearing it he is not checking manually checking BGs either.      Denies diarrhea, constipation, chest pain, shortness of breath, vision changes, numbness and tingling in feet/hands.    Weight loss of 5 lbs since last visit.    Pt does not have a history of DM retinopathy.  Last eye exam was 12/21    Pt does have a history of nephropathy.  Patient is currently taking ARB    Pt does have neuropathy.    Pt does not have a history of CAD or CVA.    Last A1C in 04/22 was 7.6    Last microalbumin in 04/22 was positive          Blood Sugars    Blood glucoses are checked via clifton- not wearing consistently           Sensor Data    Time in range 79%  High 21%  Very high 0%  Low 0%  Very low 0%      Average Glucose - 150 mg/dL      Sensor Wear - 5 % - advised that he needs to wear it more consistently or he needs to check his BGs              Hyperlipidemia     Pt denies any muscle/body aches, chest pain, or shortness of breath    Pt is currently taking atorvastatin 40 mg HS    Last lipid panel in 04/22 showed Total 131, Triglycerides 75, HDL 33 and LDL 83            Hypertension    Pt denies any chest pain, palpitations, shortness of breath, or headache    Current regimen includes losartan 25 mg daily            Vitamin D Deficiency    Current treatment includes 2,000 units daily    Last Vit D level was 40.0 in 04/22             Other History    He has history of kidney stones and had  "previous lithotripsies.  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     He had multiple colon polyps removed in July 2020 by Dr. Brito.  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    Objective   Vital Signs:   /78   Pulse 84   Ht 180.3 cm (71\")   Wt 80.4 kg (177 lb 3.2 oz)   SpO2 99%   BMI 24.71 kg/m²       Physical Exam   Physical Exam  Constitutional:       General: He is not in acute distress.     Appearance: Normal appearance. He is not diaphoretic.   HENT:      Head: Normocephalic and atraumatic.   Eyes:      General:         Right eye: No discharge.         Left eye: No discharge.   Cardiovascular:      Rate and Rhythm: Normal rate and regular rhythm.      Heart sounds: Normal heart sounds. No murmur heard.    No friction rub. No gallop.   Pulmonary:      Effort: Pulmonary effort is normal. No respiratory distress.      Breath sounds: Normal breath sounds. No wheezing.   Skin:     General: Skin is warm and dry.   Neurological:      Mental Status: He is alert.   Psychiatric:         Mood and Affect: Mood normal.         Behavior: Behavior normal.                    Results Review:   Hemoglobin A1C   Date Value Ref Range Status   04/21/2022 7.6 (H) 4.8 - 5.6 % Final     Comment:              Prediabetes: 5.7 - 6.4           Diabetes: >6.4           Glycemic control for adults with diabetes: <7.0       Triglycerides   Date Value Ref Range Status   04/21/2022 75 0 - 149 mg/dL Final     HDL Cholesterol   Date Value Ref Range Status   04/21/2022 33 (L) >39 mg/dL Final     LDL Chol Calc (NIH)   Date Value Ref Range Status   04/21/2022 83 0 - 99 mg/dL Final     VLDL Cholesterol Jaiver   Date Value Ref Range Status   04/21/2022 15 5 - 40 mg/dL Final         Assessment and Plan {CC Problem List  Visit Diagnosis  ROS  Review (Popup)  Health Maintenance  Quality  " BestPractice  Medications  [facility]  SnapShot Encounters  Media :23  Diagnoses and all orders for this visit:    1. Type 2 diabetes mellitus without complication, with long-term current use of insulin (HCC) (Primary)  -     Semaglutide, 1 MG/DOSE, (Ozempic, 1 MG/DOSE,) 4 MG/3ML solution pen-injector; Inject 1 mg under the skin into the appropriate area as directed 1 (One) Time Per Week.  Dispense: 9 mL; Refill: 1  -     Hemoglobin A1c; Future  -     Comprehensive Metabolic Panel; Future    A1C is better at 7.6%, but he is still missing his Xigduo.  He states that it is not from SE, he just doesn't take it.  He does state that he is consistent with Toujeo and Ozempic  Continue with Toujeo 60 units every morning,   Increase Ozempic to 1 mg weekly, sense he takes consistently   Continue with Xigduo XR 5/1000 mg 2 tablets daily and advised to get more consistent with taking it.  Advised to consistently were his miguel angel or prick his finger.  That this data helps us with deciding want medications needs to be adjusted.  Asked if miguel angel was a cost issue, he stated no.  Check labs prior to next visit         2. Other hyperlipidemia  -     Comprehensive Metabolic Panel; Future  -     Lipid Panel; Future    Lipid panel is good  Continue with statin      3. Essential hypertension  -     Comprehensive Metabolic Panel; Future    Stable  Continue with current medication regimen  Defer management to PCP      4. Vitamin D deficiency  -     Vitamin D 25 Hydroxy; Future    Vit D levels are good  Continue with supplement         No refills needed at this time      RTC in 4 months with me, labs prior and 8 months with Dr. Coker      Follow Up     Patient was given instructions and counseling regarding her condition or for health maintenance advice. Please see specific information pulled into the AVS if appropriate.              Kori Keith, TIKI  05/05/22

## 2022-06-09 RX ORDER — INSULIN GLARGINE 300 U/ML
60 INJECTION, SOLUTION SUBCUTANEOUS DAILY
Qty: 18 ML | Refills: 0 | Status: SHIPPED | OUTPATIENT
Start: 2022-06-09 | End: 2022-07-14

## 2022-06-13 RX ORDER — FLASH GLUCOSE SENSOR
1 KIT MISCELLANEOUS
Qty: 2 EACH | Refills: 5 | Status: SHIPPED | OUTPATIENT
Start: 2022-06-13 | End: 2023-01-03

## 2022-07-14 RX ORDER — INSULIN GLARGINE 300 U/ML
60 INJECTION, SOLUTION SUBCUTANEOUS DAILY
Qty: 18 ML | Refills: 0 | Status: SHIPPED | OUTPATIENT
Start: 2022-07-14 | End: 2023-03-03

## 2022-07-14 RX ORDER — ATORVASTATIN CALCIUM 40 MG/1
40 TABLET, FILM COATED ORAL DAILY
Qty: 90 TABLET | Refills: 1 | Status: SHIPPED | OUTPATIENT
Start: 2022-07-14

## 2022-07-27 RX ORDER — ATORVASTATIN CALCIUM 40 MG/1
TABLET, FILM COATED ORAL
Qty: 90 TABLET | Refills: 1 | OUTPATIENT
Start: 2022-07-27

## 2022-08-23 DIAGNOSIS — Z79.4 TYPE 2 DIABETES MELLITUS WITHOUT COMPLICATION, WITH LONG-TERM CURRENT USE OF INSULIN: ICD-10-CM

## 2022-08-23 DIAGNOSIS — E11.9 TYPE 2 DIABETES MELLITUS WITHOUT COMPLICATION, WITH LONG-TERM CURRENT USE OF INSULIN: ICD-10-CM

## 2022-08-23 DIAGNOSIS — E55.9 VITAMIN D DEFICIENCY: ICD-10-CM

## 2022-08-23 DIAGNOSIS — E78.49 OTHER HYPERLIPIDEMIA: ICD-10-CM

## 2022-08-23 DIAGNOSIS — I10 ESSENTIAL HYPERTENSION: ICD-10-CM

## 2022-08-24 LAB
25(OH)D3+25(OH)D2 SERPL-MCNC: 32.5 NG/ML (ref 30–100)
ALBUMIN SERPL-MCNC: 5.1 G/DL (ref 4–5)
ALBUMIN/GLOB SERPL: 2 {RATIO} (ref 1.2–2.2)
ALP SERPL-CCNC: 88 IU/L (ref 44–121)
ALT SERPL-CCNC: 27 IU/L (ref 0–44)
AST SERPL-CCNC: 19 IU/L (ref 0–40)
BILIRUB SERPL-MCNC: 0.7 MG/DL (ref 0–1.2)
BUN SERPL-MCNC: 14 MG/DL (ref 6–24)
BUN/CREAT SERPL: 14 (ref 9–20)
CALCIUM SERPL-MCNC: 10 MG/DL (ref 8.7–10.2)
CHLORIDE SERPL-SCNC: 101 MMOL/L (ref 96–106)
CHOLEST SERPL-MCNC: 139 MG/DL (ref 100–199)
CO2 SERPL-SCNC: 21 MMOL/L (ref 20–29)
CREAT SERPL-MCNC: 1.01 MG/DL (ref 0.76–1.27)
EGFRCR-CYS SERPLBLD CKD-EPI 2021: 92 ML/MIN/1.73
GLOBULIN SER CALC-MCNC: 2.5 G/DL (ref 1.5–4.5)
GLUCOSE SERPL-MCNC: 99 MG/DL (ref 65–99)
HBA1C MFR BLD: 8 % (ref 4.8–5.6)
HDLC SERPL-MCNC: 34 MG/DL
IMP & REVIEW OF LAB RESULTS: NORMAL
LDLC SERPL CALC-MCNC: 89 MG/DL (ref 0–99)
POTASSIUM SERPL-SCNC: 4.2 MMOL/L (ref 3.5–5.2)
PROT SERPL-MCNC: 7.6 G/DL (ref 6–8.5)
SODIUM SERPL-SCNC: 143 MMOL/L (ref 134–144)
TRIGL SERPL-MCNC: 79 MG/DL (ref 0–149)
VLDLC SERPL CALC-MCNC: 16 MG/DL (ref 5–40)

## 2022-09-06 NOTE — PROGRESS NOTES
Chief Complaint  Chief Complaint   Patient presents with   • Diabetes     Type2: Clifton- Patient has no hx of retinopathy or neuropathy        Subjective          History of Present Illness    Gonzalo Mcduffie 46 y.o. presents for a follow-up evaluation for type 2 DM     He has been diabetic since 2002 and started insulin in 2016     Pt is on the following medications for their DM: Toujeo 60 units every morning, Ozempic 0.5 mg weekly and Xigduo XR 5/1000 mg 2 tablets daily.        Never started 1 mg Ozempic  Taking Toujeo and Ozempic regularly, for the most part.  Missing Xigduo a couple to several times a week.  He is not wearing Clifton on regular basis and when he is not wearing it he is not checking manually checking BGs either.        Denies diarrhea, constipation, chest pain, shortness of breath, vision changes, numbness and tingling in feet/hands.    Weight stable since last visit.    Pt does not have a history of DM retinopathy.  Last eye exam was 12/21     Pt does have a history of nephropathy.  Patient is currently taking ARB     Pt does have neuropathy.     Pt does not have a history of CAD or CVA.    Last A1C in 08/22 was 8.0    Last microalbumin in 04/22 was positive        Blood Sugars    Blood glucoses are checked via clifton - not wearing consistently             Sensor Data    Time in range 89%  High 11%  Very high 0%  Low 0%  Very low 0%      Average Glucose - 136 mg/dL      Sensor Wear - 7 %          Hyperlipidemia     Pt denies any muscle/body aches, chest pain, or shortness of breath    Pt is currently taking atorvastatin 40 mg HS    Last lipid panel in 08/22 showed Total 139, Triglycerides 79, HDL 34 and LDL 89            Hypertension    Pt denies any chest pain, palpitations, shortness of breath, or headache    Current regimen includes losartan 25 mg daily            Vitamin D Deficiency    Current treatment includes  2,000 units daily    Last Vit D level was 32.5 in 08/22             Other  "History     He has history of kidney stones and had previous lithotripsies.  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     He had multiple colon polyps removed in July 2020 by Dr. Brito.  One of the polyps was tubular adenoma with high-grade dysplasia          I have reviewed the patient's allergies, medicines, past medical hx, family hx and social hx.    Objective   Vital Signs:   /70   Pulse 86   Temp 97.4 °F (36.3 °C) (Temporal)   Ht 180.3 cm (71\")   Wt 80.8 kg (178 lb 3.2 oz)   SpO2 97%   BMI 24.85 kg/m²       Physical Exam   Physical Exam  Constitutional:       General: He is not in acute distress.     Appearance: Normal appearance. He is not diaphoretic.   HENT:      Head: Normocephalic and atraumatic.   Eyes:      General:         Right eye: No discharge.         Left eye: No discharge.   Skin:     General: Skin is warm and dry.   Neurological:      Mental Status: He is alert.   Psychiatric:         Mood and Affect: Mood normal.         Behavior: Behavior normal.                    Results Review:   Hemoglobin A1C   Date Value Ref Range Status   08/23/2022 8.0 (H) 4.8 - 5.6 % Final     Comment:              Prediabetes: 5.7 - 6.4           Diabetes: >6.4           Glycemic control for adults with diabetes: <7.0       Triglycerides   Date Value Ref Range Status   08/23/2022 79 0 - 149 mg/dL Final     HDL Cholesterol   Date Value Ref Range Status   08/23/2022 34 (L) >39 mg/dL Final     LDL Chol Calc (NIH)   Date Value Ref Range Status   08/23/2022 89 0 - 99 mg/dL Final     VLDL Cholesterol Javier   Date Value Ref Range Status   08/23/2022 16 5 - 40 mg/dL Final         Assessment and Plan {CC Problem List  Visit Diagnosis  ROS  Review (Popup)  Health Maintenance  Quality  BestPractice  Medications  [employer]  SnapShot Encounters  Media :23  Diagnoses and all orders for this visit:    1. Type 2 diabetes mellitus without complication, with long-term current use of insulin (HCC) " (Primary)  -     Semaglutide, 1 MG/DOSE, (Ozempic, 1 MG/DOSE,) 4 MG/3ML solution pen-injector; Inject 1 mg under the skin into the appropriate area as directed 1 (One) Time Per Week.  Dispense: 9 mL; Refill: 1    A1C is up to 8%  Get more consistent with taking medication on regular basis  Increase Toujeo 64 units every morning  Increase Ozempic 1 mg weekly   Continue with Xigduo XR 5/1000 mg 2 tablets daily  Continue with miguel angel - advised that he needs to wear it on a regular basis and when he is wearing it he needs to be scanning it to check his BGs  Asked why patient is missing doses of his medications.  Pt denies side effects from the medication and states that it's just him not taking the medications.      2. Other hyperlipidemia    Lipid panel is good  Continue with statin        3. Essential hypertension    Stable  Continue with current medication regimen  Defer management to PCP        4. Vitamin D deficiency    Vitamin D Levels are good  Continue with supplement        RTC on 01/05/23 with Dr. Coker and labs on 12/22/22      Follow Up     Patient was given instructions and counseling regarding her condition or for health maintenance advice. Please see specific information pulled into the AVS if appropriate.              Kori Keith, TIKI  09/07/22

## 2022-09-07 ENCOUNTER — OFFICE VISIT (OUTPATIENT)
Dept: ENDOCRINOLOGY | Age: 47
End: 2022-09-07

## 2022-09-07 VITALS
BODY MASS INDEX: 24.95 KG/M2 | HEART RATE: 86 BPM | OXYGEN SATURATION: 97 % | HEIGHT: 71 IN | TEMPERATURE: 97.4 F | DIASTOLIC BLOOD PRESSURE: 70 MMHG | SYSTOLIC BLOOD PRESSURE: 112 MMHG | WEIGHT: 178.2 LBS

## 2022-09-07 DIAGNOSIS — E11.9 TYPE 2 DIABETES MELLITUS WITHOUT COMPLICATION, WITH LONG-TERM CURRENT USE OF INSULIN: Primary | ICD-10-CM

## 2022-09-07 DIAGNOSIS — E55.9 VITAMIN D DEFICIENCY: ICD-10-CM

## 2022-09-07 DIAGNOSIS — I10 ESSENTIAL HYPERTENSION: ICD-10-CM

## 2022-09-07 DIAGNOSIS — Z79.4 TYPE 2 DIABETES MELLITUS WITHOUT COMPLICATION, WITH LONG-TERM CURRENT USE OF INSULIN: Primary | ICD-10-CM

## 2022-09-07 DIAGNOSIS — E78.49 OTHER HYPERLIPIDEMIA: ICD-10-CM

## 2022-09-07 PROCEDURE — 99214 OFFICE O/P EST MOD 30 MIN: CPT | Performed by: NURSE PRACTITIONER

## 2022-09-07 PROCEDURE — 95251 CONT GLUC MNTR ANALYSIS I&R: CPT | Performed by: NURSE PRACTITIONER

## 2022-09-07 RX ORDER — SEMAGLUTIDE 1.34 MG/ML
1 INJECTION, SOLUTION SUBCUTANEOUS WEEKLY
Qty: 9 ML | Refills: 1 | Status: SHIPPED | OUTPATIENT
Start: 2022-09-07

## 2022-09-07 NOTE — PATIENT INSTRUCTIONS
Get more consistent with taking medication on regular basis  Increase Toujeo 64 units every morning  Increase Ozempic 1 mg weekly   Continue with Xigduo XR 5/1000 mg 2 tablets daily  Wear Clifton more consistently

## 2022-09-09 ENCOUNTER — DOCUMENTATION (OUTPATIENT)
Dept: ENDOCRINOLOGY | Age: 47
End: 2022-09-09

## 2022-10-18 RX ORDER — SEMAGLUTIDE 1.34 MG/ML
0.5 INJECTION, SOLUTION SUBCUTANEOUS WEEKLY
Refills: 5 | OUTPATIENT
Start: 2022-10-18

## 2022-12-19 ENCOUNTER — TELEPHONE (OUTPATIENT)
Dept: ENDOCRINOLOGY | Age: 47
End: 2022-12-19

## 2022-12-19 DIAGNOSIS — E78.49 OTHER HYPERLIPIDEMIA: ICD-10-CM

## 2022-12-19 DIAGNOSIS — E55.9 VITAMIN D DEFICIENCY: Primary | ICD-10-CM

## 2022-12-19 DIAGNOSIS — Z79.4 TYPE 2 DIABETES MELLITUS WITHOUT COMPLICATION, WITH LONG-TERM CURRENT USE OF INSULIN: ICD-10-CM

## 2022-12-19 DIAGNOSIS — E11.9 TYPE 2 DIABETES MELLITUS WITHOUT COMPLICATION, WITH LONG-TERM CURRENT USE OF INSULIN: ICD-10-CM

## 2023-01-03 RX ORDER — FLASH GLUCOSE SENSOR
1 KIT MISCELLANEOUS
Qty: 6 EACH | Refills: 3 | Status: SHIPPED | OUTPATIENT
Start: 2023-01-03 | End: 2023-01-05 | Stop reason: ALTCHOICE

## 2023-01-05 ENCOUNTER — OFFICE VISIT (OUTPATIENT)
Dept: ENDOCRINOLOGY | Age: 48
End: 2023-01-05
Payer: COMMERCIAL

## 2023-01-05 VITALS
OXYGEN SATURATION: 97 % | WEIGHT: 182.8 LBS | SYSTOLIC BLOOD PRESSURE: 112 MMHG | HEART RATE: 83 BPM | TEMPERATURE: 97.7 F | BODY MASS INDEX: 25.59 KG/M2 | HEIGHT: 71 IN | DIASTOLIC BLOOD PRESSURE: 66 MMHG

## 2023-01-05 DIAGNOSIS — Z91.14 NONCOMPLIANCE WITH MEDICATION REGIMEN: ICD-10-CM

## 2023-01-05 DIAGNOSIS — Z79.4 TYPE 2 DIABETES MELLITUS WITHOUT COMPLICATION, WITH LONG-TERM CURRENT USE OF INSULIN: Primary | ICD-10-CM

## 2023-01-05 DIAGNOSIS — I10 ESSENTIAL HYPERTENSION: ICD-10-CM

## 2023-01-05 DIAGNOSIS — E78.49 OTHER HYPERLIPIDEMIA: ICD-10-CM

## 2023-01-05 DIAGNOSIS — E55.9 VITAMIN D DEFICIENCY: ICD-10-CM

## 2023-01-05 DIAGNOSIS — E11.9 TYPE 2 DIABETES MELLITUS WITHOUT COMPLICATION, WITH LONG-TERM CURRENT USE OF INSULIN: Primary | ICD-10-CM

## 2023-01-05 PROCEDURE — 99214 OFFICE O/P EST MOD 30 MIN: CPT | Performed by: INTERNAL MEDICINE

## 2023-01-05 PROCEDURE — 95251 CONT GLUC MNTR ANALYSIS I&R: CPT | Performed by: INTERNAL MEDICINE

## 2023-01-05 RX ORDER — BLOOD-GLUCOSE SENSOR
1 EACH MISCELLANEOUS
Qty: 2 EACH | Refills: 6 | Status: SHIPPED | OUTPATIENT
Start: 2023-01-05

## 2023-01-05 NOTE — LETTER
January 5, 2023     Iwona Ramirez MD  [address] [facility] [address]    Patient: Gonzalo Mcduffie   YOB: 1975   Date of Visit: 1/5/2023       Dear Dr. Ashley MD:    Thank you for referring Gonzalo Mcduffie to me for evaluation. Below are the relevant portions of my assessment and plan of care.    If you have questions, please do not hesitate to call me. I look forward to following Gonzalo along with you.         Sincerely,        Benton Coker MD        CC: No Recipients  Benton Coker MD  01/05/23 1216  Signed  Subjective    Gonzalo Mcduffie is a 47 y.o. male.     History of Present Illness     Patient is a 47-year-old male came in for follow-up.      He has known diabetes mellitus since 2002 and started on insulin in 2016.  He is on Toujeo 60 units every morning, Ozempic 1 mg weekly, and Xigduo XR 5/1000 mg 2 tablets once a day.  He misses taking Xigduo XR for 3-4 times a week.  He admits to dietary noncompliance and eats out often.  He uses a freestyle miguel angel 14 sensor but he has not been scanning regularly.   He has gained 4 pounds since September 2022.  Hemoglobin A1c done in December 2022 is 8.1%.     Sensor data reviewed.  He scans sensor 1-3 times per day and there are a lot of lost data.  Average glucose 175 mg per DL.  Time in target 51%  Time above target 49%  Fasting glucose are 110 or higher.  He has no early morning hypoglycemia.    His last eye examination was in December 2022.  He denies retinopathy or nephropathy.  He denies numbness, tingling or burning in his hands or feet.  Urine microalbumin was elevated in December 2022.  He is on losartan     He has hyperlipidemia is on Lipitor 40 mg/day.  He denies myalgia. He misses taking Lipitor intermittently.  Lipid panel done in December 2022 are as follows: Cholesterol 155.  HDL 30.  LDL 86.  Triglycerides 232.     He has hypertension is on losartan 25 mg once a day.  He has no previous history of heart attack or  stroke.  He denies chest pain or shortness of breath.       He has history of vitamin D deficiency and is on vitamin D2 daily.  5 hydroxy vitamin D done in December 2022 is 26.8 ng/mL.     He has history of kidney stones and had previous lithotripsies.  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     He had multiple colon polyps removed in July 2020 by Dr. Brito.  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  He had a follow-up colonoscopy in May 2021.  He denies bowel complaints.  His father had colon cancer in his 70s.     He had 3 Pfizer Covid vaccines without major side effects.    The following portions of the patient's history were reviewed and updated as appropriate: allergies, current medications, past family history, past medical history, past social history, past surgical history and problem list.    Review of Systems   HENT: Negative.    Eyes: Negative.    Respiratory: Negative for shortness of breath.    Cardiovascular: Negative for chest pain and palpitations.   Gastrointestinal: Negative.    Endocrine: Negative for cold intolerance and heat intolerance.   Genitourinary: Negative.    Musculoskeletal: Negative for myalgias.   Neurological: Negative for numbness.     Vitals:    01/05/23 1058   BP: 112/66   Pulse: 83   Temp: 97.7 °F (36.5 °C)   TempSrc: Temporal   SpO2: 97%   Weight: 82.9 kg (182 lb 12.8 oz)   Height: 180.3 cm (70.98\")      Objective    Physical Exam  Constitutional:       General: He is not in acute distress.     Appearance: Normal appearance. He is not ill-appearing, toxic-appearing or diaphoretic.   Eyes:      General: No scleral icterus.        Right eye: No discharge.         Left eye: No discharge.      Extraocular Movements: Extraocular movements intact.      Conjunctiva/sclera: Conjunctivae normal.   Neck:      Vascular: No carotid bruit.   Cardiovascular:      Rate and Rhythm: Normal rate and regular rhythm.      Pulses: Normal pulses.      Heart  sounds: Normal heart sounds. No murmur heard.    No friction rub.   Pulmonary:      Effort: Pulmonary effort is normal. No respiratory distress.      Breath sounds: Normal breath sounds. No stridor. No rales.   Chest:      Chest wall: No tenderness.   Abdominal:      General: Bowel sounds are normal.      Palpations: Abdomen is soft.      Tenderness: There is no right CVA tenderness or left CVA tenderness.   Lymphadenopathy:      Cervical: No cervical adenopathy.   Skin:     General: Skin is warm and dry.   Neurological:      Mental Status: He is alert and oriented to person, place, and time.      Comments: Intact light touch on lower ext.       Results Encounter on 12/22/2022   Component Date Value Ref Range Status   • Hemoglobin A1C 12/23/2022 8.10 (H)  4.80 - 5.60 % Final    Comment: Hemoglobin A1C Ranges:  Increased Risk for Diabetes  5.7% to 6.4%  Diabetes                     >= 6.5%  Diabetic Goal                < 7.0%     • Glucose 12/23/2022 127 (H)  65 - 99 mg/dL Final   • BUN 12/23/2022 16  6 - 20 mg/dL Final   • Creatinine 12/23/2022 0.95  0.76 - 1.27 mg/dL Final   • EGFR Result 12/23/2022 98.7  >60.0 mL/min/1.73 Final    Comment: National Kidney Foundation and American Society of  Nephrology (ASN) Task Force recommended calculation based  on the Chronic Kidney Disease Epidemiology Collaboration  (CKD-EPI) equation refit without adjustment for race.  GFR Normal >60  Chronic Kidney Disease <60  Kidney Failure <15     • BUN/Creatinine Ratio 12/23/2022 16.8  7.0 - 25.0 Final   • Sodium 12/23/2022 139  136 - 145 mmol/L Final   • Potassium 12/23/2022 4.0  3.5 - 5.2 mmol/L Final   • Chloride 12/23/2022 97 (L)  98 - 107 mmol/L Final   • Total CO2 12/23/2022 29.1 (H)  22.0 - 29.0 mmol/L Final   • Calcium 12/23/2022 9.6  8.6 - 10.5 mg/dL Final   • Total Protein 12/23/2022 7.0  6.0 - 8.5 g/dL Final   • Albumin 12/23/2022 4.70  3.50 - 5.20 g/dL Final   • Globulin 12/23/2022 2.3  gm/dL Final   • A/G Ratio 12/23/2022  2.0  g/dL Final   • Total Bilirubin 12/23/2022 0.5  0.0 - 1.2 mg/dL Final   • Alkaline Phosphatase 12/23/2022 89  39 - 117 U/L Final   • AST (SGOT) 12/23/2022 19  1 - 40 U/L Final   • ALT (SGPT) 12/23/2022 31  1 - 41 U/L Final   • Creatinine, Urine 12/23/2022 85.3  Not Estab. mg/dL Final   • Microalbumin, Urine 12/23/2022 38.9  Not Estab. ug/mL Final   • Microalbumin/Creatinine Ratio 12/23/2022 46 (H)  0 - 29 mg/g creat Final    Comment:                        Normal:                0 -  29                         Moderately increased: 30 - 300                         Severely increased:       >300     • Total Cholesterol 12/23/2022 155  0 - 200 mg/dL Final    Comment: Cholesterol Reference Ranges  (U.S. Department of Health and Human Services ATP III  Classifications)  Desirable          <200 mg/dL  Borderline High    200-239 mg/dL  High Risk          >240 mg/dL  Triglyceride Reference Ranges  (U.S. Department of Health and Human Services ATP III  Classifications)  Normal           <150 mg/dL  Borderline High  150-199 mg/dL  High             200-499 mg/dL  Very High        >500 mg/dL  HDL Reference Ranges  (U.S. Department of Health and Human Services ATP III  Classifications)  Low     <40 mg/dl (major risk factor for CHD)  High    >60 mg/dl ('negative' risk factor for CHD)  LDL Reference Ranges  (U.S. Department of Health and Human Services ATP III  Classifications)  Optimal          <100 mg/dL  Near Optimal     100-129 mg/dL  Borderline High  130-159 mg/dL  High             160-189 mg/dL  Very High        >189 mg/dL     • Triglycerides 12/23/2022 232 (H)  0 - 150 mg/dL Final   • HDL Cholesterol 12/23/2022 30 (L)  40 - 60 mg/dL Final   • VLDL Cholesterol Javier 12/23/2022 39  5 - 40 mg/dL Final   • LDL Chol Calc (NIH) 12/23/2022 86  0 - 100 mg/dL Final   • 25 Hydroxy, Vitamin D 12/23/2022 26.8 (L)  30.0 - 100.0 ng/ml Final    Comment: Results may be falsely increased if patient taking Biotin.  Reference Range for  Total Vitamin D 25(OH)  Deficiency <20.0 ng/mL  Insufficiency 21-29 ng/mL  Sufficiency  ng/mL  Toxicity >100 ng/ml     • Interpretation 12/23/2022 Note   Final    Supplemental report is available.     Assessment & Plan   Diagnoses and all orders for this visit:    1. Type 2 diabetes mellitus without complication, with long-term current use of insulin (HCC) (Primary)    2. Essential hypertension    3. Other hyperlipidemia    4. Vitamin D deficiency    5. Noncompliance with medication regimen    Other orders  -     Continuous Blood Gluc Sensor (FreeStyle Clifton 3 Sensor) misc; 1 each Every 14 (Fourteen) Days.  Dispense: 2 each; Refill: 6      Continue Toujeo 60 units every morning and Ozempic 1 mg weekly.  Patient advised takes Xigduo XR regularly.  Switch to freestyle clifton 3 sensor.  Continue Lipitor 40 mg/day.  Continue losartan 25 mg/day.  Continue vitamin D supplements.    Advised to see Dr. Ramirez periodically.    Copy of my note sent to Dr. Ramirez.    RTC 3 mos with DAVIDE Keith NP

## 2023-01-05 NOTE — PROGRESS NOTES
Subjective   Gonzalo Mcduffie is a 47 y.o. male.     History of Present Illness     Patient is a 47-year-old male came in for follow-up.      He has known diabetes mellitus since 2002 and started on insulin in 2016.  He is on Toujeo 60 units every morning, Ozempic 1 mg weekly, and Xigduo XR 5/1000 mg 2 tablets once a day.  He misses taking Xigduo XR for 3-4 times a week.  He admits to dietary noncompliance and eats out often.  He uses a freestyle miguel angel 14 sensor but he has not been scanning regularly.   He has gained 4 pounds since September 2022.  Hemoglobin A1c done in December 2022 is 8.1%.     Sensor data reviewed.  He scans sensor 1-3 times per day and there are a lot of lost data.  Average glucose 175 mg per DL.  Time in target 51%  Time above target 49%  Fasting glucose are 110 or higher.  He has no early morning hypoglycemia.    His last eye examination was in December 2022.  He denies retinopathy or nephropathy.  He denies numbness, tingling or burning in his hands or feet.  Urine microalbumin was elevated in December 2022.  He is on losartan     He has hyperlipidemia is on Lipitor 40 mg/day.  He denies myalgia. He misses taking Lipitor intermittently.  Lipid panel done in December 2022 are as follows: Cholesterol 155.  HDL 30.  LDL 86.  Triglycerides 232.     He has hypertension is on losartan 25 mg once a day.  He has no previous history of heart attack or stroke.  He denies chest pain or shortness of breath.       He has history of vitamin D deficiency and is on vitamin D2 daily.  5 hydroxy vitamin D done in December 2022 is 26.8 ng/mL.     He has history of kidney stones and had previous lithotripsies.  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     He had multiple colon polyps removed in July 2020 by Dr. Brito.  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  He had a follow-up colonoscopy in May 2021.  He denies bowel complaints.  His father had colon cancer in his  70s.     He had 3 Pfizer Covid vaccines without major side effects.    The following portions of the patient's history were reviewed and updated as appropriate: allergies, current medications, past family history, past medical history, past social history, past surgical history and problem list.    Review of Systems   HENT: Negative.    Eyes: Negative.    Respiratory: Negative for shortness of breath.    Cardiovascular: Negative for chest pain and palpitations.   Gastrointestinal: Negative.    Endocrine: Negative for cold intolerance and heat intolerance.   Genitourinary: Negative.    Musculoskeletal: Negative for myalgias.   Neurological: Negative for numbness.     Vitals:    01/05/23 1058   BP: 112/66   Pulse: 83   Temp: 97.7 °F (36.5 °C)   TempSrc: Temporal   SpO2: 97%   Weight: 82.9 kg (182 lb 12.8 oz)   Height: 180.3 cm (70.98\")      Objective   Physical Exam  Constitutional:       General: He is not in acute distress.     Appearance: Normal appearance. He is not ill-appearing, toxic-appearing or diaphoretic.   Eyes:      General: No scleral icterus.        Right eye: No discharge.         Left eye: No discharge.      Extraocular Movements: Extraocular movements intact.      Conjunctiva/sclera: Conjunctivae normal.   Neck:      Vascular: No carotid bruit.   Cardiovascular:      Rate and Rhythm: Normal rate and regular rhythm.      Pulses: Normal pulses.      Heart sounds: Normal heart sounds. No murmur heard.    No friction rub.   Pulmonary:      Effort: Pulmonary effort is normal. No respiratory distress.      Breath sounds: Normal breath sounds. No stridor. No rales.   Chest:      Chest wall: No tenderness.   Abdominal:      General: Bowel sounds are normal.      Palpations: Abdomen is soft.      Tenderness: There is no right CVA tenderness or left CVA tenderness.   Lymphadenopathy:      Cervical: No cervical adenopathy.   Skin:     General: Skin is warm and dry.   Neurological:      Mental Status: He is  alert and oriented to person, place, and time.      Comments: Intact light touch on lower ext.       Results Encounter on 12/22/2022   Component Date Value Ref Range Status   • Hemoglobin A1C 12/23/2022 8.10 (H)  4.80 - 5.60 % Final    Comment: Hemoglobin A1C Ranges:  Increased Risk for Diabetes  5.7% to 6.4%  Diabetes                     >= 6.5%  Diabetic Goal                < 7.0%     • Glucose 12/23/2022 127 (H)  65 - 99 mg/dL Final   • BUN 12/23/2022 16  6 - 20 mg/dL Final   • Creatinine 12/23/2022 0.95  0.76 - 1.27 mg/dL Final   • EGFR Result 12/23/2022 98.7  >60.0 mL/min/1.73 Final    Comment: National Kidney Foundation and American Society of  Nephrology (ASN) Task Force recommended calculation based  on the Chronic Kidney Disease Epidemiology Collaboration  (CKD-EPI) equation refit without adjustment for race.  GFR Normal >60  Chronic Kidney Disease <60  Kidney Failure <15     • BUN/Creatinine Ratio 12/23/2022 16.8  7.0 - 25.0 Final   • Sodium 12/23/2022 139  136 - 145 mmol/L Final   • Potassium 12/23/2022 4.0  3.5 - 5.2 mmol/L Final   • Chloride 12/23/2022 97 (L)  98 - 107 mmol/L Final   • Total CO2 12/23/2022 29.1 (H)  22.0 - 29.0 mmol/L Final   • Calcium 12/23/2022 9.6  8.6 - 10.5 mg/dL Final   • Total Protein 12/23/2022 7.0  6.0 - 8.5 g/dL Final   • Albumin 12/23/2022 4.70  3.50 - 5.20 g/dL Final   • Globulin 12/23/2022 2.3  gm/dL Final   • A/G Ratio 12/23/2022 2.0  g/dL Final   • Total Bilirubin 12/23/2022 0.5  0.0 - 1.2 mg/dL Final   • Alkaline Phosphatase 12/23/2022 89  39 - 117 U/L Final   • AST (SGOT) 12/23/2022 19  1 - 40 U/L Final   • ALT (SGPT) 12/23/2022 31  1 - 41 U/L Final   • Creatinine, Urine 12/23/2022 85.3  Not Estab. mg/dL Final   • Microalbumin, Urine 12/23/2022 38.9  Not Estab. ug/mL Final   • Microalbumin/Creatinine Ratio 12/23/2022 46 (H)  0 - 29 mg/g creat Final    Comment:                        Normal:                0 -  29                         Moderately increased: 30 - 300                          Severely increased:       >300     • Total Cholesterol 12/23/2022 155  0 - 200 mg/dL Final    Comment: Cholesterol Reference Ranges  (U.S. Department of Health and Human Services ATP III  Classifications)  Desirable          <200 mg/dL  Borderline High    200-239 mg/dL  High Risk          >240 mg/dL  Triglyceride Reference Ranges  (U.S. Department of Health and Human Services ATP III  Classifications)  Normal           <150 mg/dL  Borderline High  150-199 mg/dL  High             200-499 mg/dL  Very High        >500 mg/dL  HDL Reference Ranges  (U.S. Department of Health and Human Services ATP III  Classifications)  Low     <40 mg/dl (major risk factor for CHD)  High    >60 mg/dl ('negative' risk factor for CHD)  LDL Reference Ranges  (U.S. Department of Health and Human Services ATP III  Classifications)  Optimal          <100 mg/dL  Near Optimal     100-129 mg/dL  Borderline High  130-159 mg/dL  High             160-189 mg/dL  Very High        >189 mg/dL     • Triglycerides 12/23/2022 232 (H)  0 - 150 mg/dL Final   • HDL Cholesterol 12/23/2022 30 (L)  40 - 60 mg/dL Final   • VLDL Cholesterol Javier 12/23/2022 39  5 - 40 mg/dL Final   • LDL Chol Calc ([facility]) 12/23/2022 86  0 - 100 mg/dL Final   • 25 Hydroxy, Vitamin D 12/23/2022 26.8 (L)  30.0 - 100.0 ng/ml Final    Comment: Results may be falsely increased if patient taking Biotin.  Reference Range for Total Vitamin D 25(OH)  Deficiency <20.0 ng/mL  Insufficiency 21-29 ng/mL  Sufficiency  ng/mL  Toxicity >100 ng/ml     • Interpretation 12/23/2022 Note   Final    Supplemental report is available.     Assessment & Plan   Diagnoses and all orders for this visit:    1. Type 2 diabetes mellitus without complication, with long-term current use of insulin (HCC) (Primary)    2. Essential hypertension    3. Other hyperlipidemia    4. Vitamin D deficiency    5. Noncompliance with medication regimen    Other orders  -     Continuous Blood Gluc Sensor  (FreeStyle Clifton 3 Sensor) misc; 1 each Every 14 (Fourteen) Days.  Dispense: 2 each; Refill: 6      Continue Toujeo 60 units every morning and Ozempic 1 mg weekly.  Patient advised takes Xigduo XR regularly.  Switch to freestyle clifton 3 sensor.  Continue Lipitor 40 mg/day.  Continue losartan 25 mg/day.  Continue vitamin D supplements.    Advised to see Dr. Ramirez periodically.    Copy of my note sent to Dr. Ramirez.    RTC 3 mos with DAVIDE Keith NP

## 2023-01-12 RX ORDER — PEN NEEDLE, DIABETIC 31 GX5/16"
1 NEEDLE, DISPOSABLE MISCELLANEOUS DAILY
Qty: 100 EACH | Refills: 3 | Status: SHIPPED | OUTPATIENT
Start: 2023-01-12

## 2023-03-03 RX ORDER — INSULIN GLARGINE 300 U/ML
60 INJECTION, SOLUTION SUBCUTANEOUS DAILY
Qty: 18 ML | Refills: 0 | Status: SHIPPED | OUTPATIENT
Start: 2023-03-03

## 2023-04-19 ENCOUNTER — OFFICE VISIT (OUTPATIENT)
Dept: ENDOCRINOLOGY | Age: 48
End: 2023-04-19
Payer: COMMERCIAL

## 2023-04-19 VITALS
SYSTOLIC BLOOD PRESSURE: 106 MMHG | OXYGEN SATURATION: 97 % | HEART RATE: 67 BPM | DIASTOLIC BLOOD PRESSURE: 70 MMHG | BODY MASS INDEX: 25.97 KG/M2 | WEIGHT: 181.4 LBS | HEIGHT: 70 IN | TEMPERATURE: 97.3 F

## 2023-04-19 DIAGNOSIS — E78.49 OTHER HYPERLIPIDEMIA: ICD-10-CM

## 2023-04-19 DIAGNOSIS — E11.65 TYPE 2 DIABETES MELLITUS WITH HYPERGLYCEMIA, WITH LONG-TERM CURRENT USE OF INSULIN: Primary | ICD-10-CM

## 2023-04-19 DIAGNOSIS — Z79.4 TYPE 2 DIABETES MELLITUS WITH HYPERGLYCEMIA, WITH LONG-TERM CURRENT USE OF INSULIN: Primary | ICD-10-CM

## 2023-04-19 DIAGNOSIS — E55.9 VITAMIN D DEFICIENCY: ICD-10-CM

## 2023-04-19 DIAGNOSIS — I10 ESSENTIAL HYPERTENSION: ICD-10-CM

## 2023-04-19 RX ORDER — MULTIVIT-MIN/IRON/FOLIC ACID/K 18-600-40
1 CAPSULE ORAL DAILY
COMMUNITY

## 2023-04-19 NOTE — PROGRESS NOTES
Chief Complaint  Chief Complaint   Patient presents with   • Type 2 diabetes mellitus without complication, with long-te     Clifton, patient has no hx of retinopathy or neuropathy        Subjective          History of Present Illness    Gonzalo Mcduffie 47 y.o. presents for a follow-up evaluation for type 2 DM     He has been diabetic since 2002 and started insulin in 2016     Pt is on the following medications for their DM: Toujeo 60 units every morning, Ozempic 1 mg weekly and Xigduo XR 5/1,000 mg 2 tablets daily.         Missing Xigduo twice a week        Denies diarrhea, constipation, chest pain, shortness of breath, vision changes or numbness and tingling in feet/hands.    Weight stable since last visit.    Pt does not have a history of DM retinopathy.  Last eye exam was early 2023     Pt does have a history of nephropathy.  Patient is currently taking ARB     Pt does have neuropathy.     Pt does not have a history of CAD or CVA.    Last A1C in 12/22 was 8.10    Last microalbumin in 12/22 was positive          Blood Sugars    Blood glucoses are checked via clifton    Fasting blood glucoses: low to mid 100s    Pre-meal blood glucoses: mid to high 100s    Pt has no episodes of hypoglycemia.          Sensor Data    Time in range 75%  High 23%  Very high 2%  Low 0%  Very low 0%      Average Glucose - 160 mg/dL      Sensor Wear - 78 %              Hyperlipidemia     Pt denies any muscle/body aches, chest pain or shortness of breath    Pt is currently taking atorvastatin 40 mg HS    Last lipid panel in 12/22 showed Total 155, HDL 30, LDL 86 and Triglycerides 232            Hypertension    Pt denies any chest pain, palpitations, shortness of breath or headache    Current regimen includes losartan 25 mg daily                 Vitamin D Deficiency     Current treatment includes  2,000 units daily     Last Vit D level was 26.8 in 12/22                 Other History     He has history of kidney stones and had previous  "lithotripsies.  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     He had multiple colon polyps removed in July 2020 by Dr. Brito.  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    Objective   Vital Signs:   /70   Pulse 67   Temp 97.3 °F (36.3 °C) (Temporal)   Ht 177.8 cm (70\")   Wt 82.3 kg (181 lb 6.4 oz)   SpO2 97%   BMI 26.03 kg/m²       Physical Exam   Physical Exam  Constitutional:       General: He is not in acute distress.     Appearance: Normal appearance. He is not diaphoretic.   HENT:      Head: Normocephalic and atraumatic.   Eyes:      General:         Right eye: No discharge.         Left eye: No discharge.   Skin:     General: Skin is warm and dry.   Neurological:      Mental Status: He is alert.   Psychiatric:         Mood and Affect: Mood normal.         Behavior: Behavior normal.                    Results Review:   Hemoglobin A1C   Date Value Ref Range Status   12/23/2022 8.10 (H) 4.80 - 5.60 % Final     Comment:     Hemoglobin A1C Ranges:  Increased Risk for Diabetes  5.7% to 6.4%  Diabetes                     >= 6.5%  Diabetic Goal                < 7.0%       Triglycerides   Date Value Ref Range Status   12/23/2022 232 (H) 0 - 150 mg/dL Final     HDL Cholesterol   Date Value Ref Range Status   12/23/2022 30 (L) 40 - 60 mg/dL Final     LDL Chol Calc (NIH)   Date Value Ref Range Status   12/23/2022 86 0 - 100 mg/dL Final     VLDL Cholesterol Javier   Date Value Ref Range Status   12/23/2022 39 5 - 40 mg/dL Final         Assessment and Plan {CC Problem List  Visit Diagnosis  ROS  Review (Popup)  Health Maintenance  Quality  BestPractice  Medications  SmartSets  SnapShot Encounters  Media :23  Diagnoses and all orders for this visit:    1. Type 2 diabetes mellitus with hyperglycemia, with long-term current use of insulin (Primary)  -     Hemoglobin " A1c  -     Comprehensive Metabolic Panel    Check labs today  Continue with Toujeo 60 units every morning, Ozempic 1 mg weekly and Xigduo XR 5/1,000 mg 2 tablets daily  Continue with Clfiton      2. Other hyperlipidemia  -     Comprehensive Metabolic Panel  -     Lipid Panel    Check labs today  Continue with statin      3. Essential hypertension  -     Comprehensive Metabolic Panel    Stable  Continue with current medication regimen  Defer management to PCP      4. Vitamin D deficiency  -     Vitamin D,25-Hydroxy    Check labs today  Continue with supplement         No refills needed at this time        Labs today  RTC in 4 months with me and 8 months with Dr. Coker        Follow Up     Patient was given instructions and counseling regarding her condition or for health maintenance advice. Please see specific information pulled into the AVS if appropriate.              Kori Keith, APRN  04/19/23

## 2023-04-20 ENCOUNTER — TELEPHONE (OUTPATIENT)
Dept: ENDOCRINOLOGY | Age: 48
End: 2023-04-20
Payer: COMMERCIAL

## 2023-04-20 LAB
25(OH)D3+25(OH)D2 SERPL-MCNC: 32.1 NG/ML (ref 30–100)
ALBUMIN SERPL-MCNC: 4.8 G/DL (ref 3.5–5.2)
ALBUMIN/GLOB SERPL: 1.7 G/DL
ALP SERPL-CCNC: 87 U/L (ref 39–117)
ALT SERPL-CCNC: 35 U/L (ref 1–41)
AST SERPL-CCNC: 22 U/L (ref 1–40)
BILIRUB SERPL-MCNC: 0.9 MG/DL (ref 0–1.2)
BUN SERPL-MCNC: 17 MG/DL (ref 6–20)
BUN/CREAT SERPL: 15.9 (ref 7–25)
CALCIUM SERPL-MCNC: 10.7 MG/DL (ref 8.6–10.5)
CHLORIDE SERPL-SCNC: 107 MMOL/L (ref 98–107)
CHOLEST SERPL-MCNC: 181 MG/DL (ref 0–200)
CO2 SERPL-SCNC: 20.1 MMOL/L (ref 22–29)
CREAT SERPL-MCNC: 1.07 MG/DL (ref 0.76–1.27)
EGFRCR SERPLBLD CKD-EPI 2021: 85.6 ML/MIN/1.73
GLOBULIN SER CALC-MCNC: 2.9 GM/DL
GLUCOSE SERPL-MCNC: 139 MG/DL (ref 65–99)
HBA1C MFR BLD: 7.2 % (ref 4.8–5.6)
HDLC SERPL-MCNC: 36 MG/DL (ref 40–60)
IMP & REVIEW OF LAB RESULTS: NORMAL
LDLC SERPL CALC-MCNC: 127 MG/DL (ref 0–100)
POTASSIUM SERPL-SCNC: 5.1 MMOL/L (ref 3.5–5.2)
PROT SERPL-MCNC: 7.7 G/DL (ref 6–8.5)
SODIUM SERPL-SCNC: 151 MMOL/L (ref 136–145)
TRIGL SERPL-MCNC: 98 MG/DL (ref 0–150)
VLDLC SERPL CALC-MCNC: 18 MG/DL (ref 5–40)

## 2023-04-20 NOTE — TELEPHONE ENCOUNTER
4/19/23 Called and left patient a message reg labs     may relay message to patient     ----- Message from TIKI Marcos sent at 4/20/2023  8:25 AM EDT -----  A1c is better at 7.2%    Kidney function is good.  Calcium is slightly elevated; make sure to stay hydrated and will monitor    Total cholesterol and triglycerides are good.  LDL is elevated, continue with statin and make sure to take regularly.    Vitamin D levels are good.  Continue with supplement

## 2023-06-15 DIAGNOSIS — Z79.4 TYPE 2 DIABETES MELLITUS WITHOUT COMPLICATION, WITH LONG-TERM CURRENT USE OF INSULIN: ICD-10-CM

## 2023-06-15 DIAGNOSIS — E11.9 TYPE 2 DIABETES MELLITUS WITHOUT COMPLICATION, WITH LONG-TERM CURRENT USE OF INSULIN: ICD-10-CM

## 2023-06-15 RX ORDER — SEMAGLUTIDE 1.34 MG/ML
1 INJECTION, SOLUTION SUBCUTANEOUS WEEKLY
Qty: 9 ML | Refills: 1 | Status: SHIPPED | OUTPATIENT
Start: 2023-06-15 | End: 2023-09-13

## 2023-06-15 RX ORDER — INSULIN GLARGINE 300 U/ML
60 INJECTION, SOLUTION SUBCUTANEOUS EVERY MORNING
Qty: 18 ML | Refills: 1 | Status: SHIPPED | OUTPATIENT
Start: 2023-06-15 | End: 2023-09-13

## 2023-08-21 ENCOUNTER — OFFICE VISIT (OUTPATIENT)
Dept: ENDOCRINOLOGY | Age: 48
End: 2023-08-21
Payer: COMMERCIAL

## 2023-08-21 VITALS
HEART RATE: 100 BPM | OXYGEN SATURATION: 99 % | DIASTOLIC BLOOD PRESSURE: 74 MMHG | BODY MASS INDEX: 25.54 KG/M2 | HEIGHT: 70 IN | WEIGHT: 178.4 LBS | SYSTOLIC BLOOD PRESSURE: 104 MMHG

## 2023-08-21 DIAGNOSIS — Z79.4 TYPE 2 DIABETES MELLITUS WITH HYPERGLYCEMIA, WITH LONG-TERM CURRENT USE OF INSULIN: Primary | ICD-10-CM

## 2023-08-21 DIAGNOSIS — E78.49 OTHER HYPERLIPIDEMIA: ICD-10-CM

## 2023-08-21 DIAGNOSIS — I10 ESSENTIAL HYPERTENSION: ICD-10-CM

## 2023-08-21 DIAGNOSIS — E55.9 VITAMIN D DEFICIENCY: ICD-10-CM

## 2023-08-21 DIAGNOSIS — E11.65 TYPE 2 DIABETES MELLITUS WITH HYPERGLYCEMIA, WITH LONG-TERM CURRENT USE OF INSULIN: Primary | ICD-10-CM

## 2023-08-21 PROCEDURE — 99214 OFFICE O/P EST MOD 30 MIN: CPT | Performed by: NURSE PRACTITIONER

## 2023-08-21 PROCEDURE — 95251 CONT GLUC MNTR ANALYSIS I&R: CPT | Performed by: NURSE PRACTITIONER

## 2023-08-21 RX ORDER — SEMAGLUTIDE 2.68 MG/ML
2 INJECTION, SOLUTION SUBCUTANEOUS WEEKLY
Qty: 9 ML | Refills: 1 | Status: SHIPPED | OUTPATIENT
Start: 2023-08-21

## 2023-08-21 RX ORDER — INSULIN GLARGINE 300 U/ML
60 INJECTION, SOLUTION SUBCUTANEOUS EVERY MORNING
Qty: 18 ML | Refills: 1 | Status: SHIPPED | OUTPATIENT
Start: 2023-08-21

## 2023-08-21 NOTE — PATIENT INSTRUCTIONS
Continue with Toujeo 60 units every morning  Increase Ozempic 2 mg weekly  Continue with Xigduo XR 5/1,000 mg 2 tablets daily.

## 2023-08-21 NOTE — PROGRESS NOTES
Chief Complaint  Chief Complaint   Patient presents with    Diabetes     Type 2: Pt clifton is attached, is up to date on eye exam, no hx of retinopathy or neuropathy.        Subjective          History of Present Illness    Gonzalo Mcduffie 47 y.o. presents for a follow-up evaluation for type 2 DM     He has been diabetic since 2002 and started insulin in 2016     Pt is on the following medications for their DM: Toujeo 60 units every morning, Ozempic 1 mg weekly and Xigduo XR 5/1,000 mg 2 tablets daily.          Denies diarrhea, constipation, chest pain, shortness of breath, vision changes or numbness and tingling in feet/hands.    Weight loss of 3 lbs since last visit.    Pt does not have a history of DM retinopathy.  Last eye exam was early 2023     Pt does have a history of nephropathy.  Patient is currently taking ARB     Pt does have neuropathy.     Pt does not have a history of CAD or CVA.    Last A1C in 04/23 was 7.2    Last microalbumin in 12/22 was positive           Blood Sugars    Blood glucoses are checked via Clifton    Fasting blood glucoses: mid to high 100s    Pre-meal blood glucoses: high 100s - 200s    Pt has no episodes of hypoglycemia.          Sensor Data    Time in range 55%  High 34%  Very high 11%  Low 0%  Very low 0%      Average Glucose - 185 mg/dL      Sensor Wear - 70 %          Hyperlipidemia     Pt denies any muscle/body aches, chest pain or shortness of breath    Pt is currently taking atorvastatin 40 mg HS     Last lipid panel in 04/23 showed Total 181, HDL 36,  and Triglycerides 98            Hypertension    Pt denies any chest pain, palpitations, shortness of breath or headache    Current regimen includes losartan 25 mg daily             Vitamin D Deficiency     Current treatment includes  2,000 units daily     Last Vit D level was 32.1 in 04/23                 Other History     He has history of kidney stones and had previous lithotripsies.  He had surgery for urethral cancer  "done by Dr. Bergman.     He had multiple colon polyps removed in July 2020 by Dr. Brito.  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    Objective   Vital Signs:   /74   Pulse 100   Ht 177.8 cm (70\")   Wt 80.9 kg (178 lb 6.4 oz)   SpO2 99%   BMI 25.60 kg/mý       Physical Exam   Physical Exam  Constitutional:       General: He is not in acute distress.     Appearance: Normal appearance. He is not diaphoretic.   HENT:      Head: Normocephalic and atraumatic.   Eyes:      General:         Right eye: No discharge.         Left eye: No discharge.   Skin:     General: Skin is warm and dry.   Neurological:      Mental Status: He is alert.   Psychiatric:         Mood and Affect: Mood normal.         Behavior: Behavior normal.                  Results Review:   Hemoglobin A1C   Date Value Ref Range Status   04/19/2023 7.20 (H) 4.80 - 5.60 % Final     Comment:     Hemoglobin A1C Ranges:  Increased Risk for Diabetes  5.7% to 6.4%  Diabetes                     >= 6.5%  Diabetic Goal                < 7.0%       Triglycerides   Date Value Ref Range Status   04/19/2023 98 0 - 150 mg/dL Final     HDL Cholesterol   Date Value Ref Range Status   04/19/2023 36 (L) 40 - 60 mg/dL Final     LDL Chol Calc (NIH)   Date Value Ref Range Status   04/19/2023 127 (H) 0 - 100 mg/dL Final     VLDL Cholesterol Javier   Date Value Ref Range Status   04/19/2023 18 5 - 40 mg/dL Final         Assessment and Plan {CC Problem List  Visit Diagnosis  ROS  Review (Popup)  Health Maintenance  Quality  BestPractice  Medications  SmartSets  SnapShot Encounters  Media :23  Diagnoses and all orders for this visit:    1. Type 2 diabetes mellitus with hyperglycemia, with long-term current use of insulin (Primary)  -     Semaglutide, 2 MG/DOSE, (Ozempic, 2 MG/DOSE,) 8 MG/3ML solution pen-injector; Inject 2 mg under the " skin into the appropriate area as directed 1 (One) Time Per Week.  Dispense: 9 mL; Refill: 1  -     Insulin Glargine, 1 Unit Dial, (Toujeo SoloStar) 300 UNIT/ML solution pen-injector injection; Inject 60 Units under the skin into the appropriate area as directed Every Morning. Indications: Type 2 Diabetes  Dispense: 18 mL; Refill: 1  -     Hemoglobin A1c  -     Comprehensive Metabolic Panel  -     TSH Rfx On Abnormal To Free T4    Continue with Toujeo 60 units every morning  Increase Ozempic 2 mg weekly  Continue with Xigduo XR 5/1,000 mg 2 tablets daily  Continue with Clifton  Check labs today      2. Other hyperlipidemia  -     Comprehensive Metabolic Panel  -     Lipid Panel    Check labs today  Continue with statin      3. Essential hypertension  -     Comprehensive Metabolic Panel    Stable  Continue with current medication regimen  Defer management to PCP      4. Vitamin D deficiency     Continue with supplement            Refills sent to pharmacy        Labs today  RTC on 12-19-23 with Dr. Coker      Follow Up     Patient was given instructions and counseling regarding her condition or for health maintenance advice. Please see specific information pulled into the AVS if appropriate.              Kori Keith, APRN  08/21/23

## 2023-08-22 LAB
ALBUMIN SERPL-MCNC: 4.5 G/DL (ref 3.5–5.2)
ALBUMIN/GLOB SERPL: 2 G/DL
ALP SERPL-CCNC: 84 U/L (ref 39–117)
ALT SERPL-CCNC: 24 U/L (ref 1–41)
AST SERPL-CCNC: 13 U/L (ref 1–40)
BILIRUB SERPL-MCNC: 0.5 MG/DL (ref 0–1.2)
BUN SERPL-MCNC: 21 MG/DL (ref 6–20)
BUN/CREAT SERPL: 20.4 (ref 7–25)
CALCIUM SERPL-MCNC: 9.9 MG/DL (ref 8.6–10.5)
CHLORIDE SERPL-SCNC: 104 MMOL/L (ref 98–107)
CHOLEST SERPL-MCNC: 125 MG/DL (ref 0–200)
CO2 SERPL-SCNC: 24.4 MMOL/L (ref 22–29)
CREAT SERPL-MCNC: 1.03 MG/DL (ref 0.76–1.27)
EGFRCR SERPLBLD CKD-EPI 2021: 89.6 ML/MIN/1.73
GLOBULIN SER CALC-MCNC: 2.3 GM/DL
GLUCOSE SERPL-MCNC: 124 MG/DL (ref 65–99)
HBA1C MFR BLD: 7 % (ref 4.8–5.6)
HDLC SERPL-MCNC: 31 MG/DL (ref 40–60)
IMP & REVIEW OF LAB RESULTS: NORMAL
LDLC SERPL CALC-MCNC: 71 MG/DL (ref 0–100)
POTASSIUM SERPL-SCNC: 4.2 MMOL/L (ref 3.5–5.2)
PROT SERPL-MCNC: 6.8 G/DL (ref 6–8.5)
SODIUM SERPL-SCNC: 142 MMOL/L (ref 136–145)
TRIGL SERPL-MCNC: 127 MG/DL (ref 0–150)
TSH SERPL DL<=0.005 MIU/L-ACNC: 1.16 UIU/ML (ref 0.27–4.2)
VLDLC SERPL CALC-MCNC: 23 MG/DL (ref 5–40)

## 2023-08-28 ENCOUNTER — PRIOR AUTHORIZATION (OUTPATIENT)
Dept: ENDOCRINOLOGY | Age: 48
End: 2023-08-28
Payer: COMMERCIAL

## 2023-08-28 NOTE — TELEPHONE ENCOUNTER
"8/28/2023      Patient Prior Authorization has been approved on Covermymeds as of today for the Ozempic 4 MG/ 3 ML Pen Injectors all information has been submitted and forward over to the patient insurance company and it came back as being approved. The PA Approval information is as follows: \" [id number]' and it is good from 07/29/2023 thru 08/27/2024. No further action needed at the present time.      (Key: [id number])      This request has been approved using information available on the patient's profile. CaseId:[id number];Status:Approved;Review Type:Prior Auth;Coverage Start Date:07/29/2023;Coverage End Date:08/27/2024;  "

## 2023-08-29 DIAGNOSIS — Z79.4 TYPE 2 DIABETES MELLITUS WITH HYPERGLYCEMIA, WITH LONG-TERM CURRENT USE OF INSULIN: Primary | ICD-10-CM

## 2023-08-29 DIAGNOSIS — E11.65 TYPE 2 DIABETES MELLITUS WITH HYPERGLYCEMIA, WITH LONG-TERM CURRENT USE OF INSULIN: Primary | ICD-10-CM

## 2023-08-29 RX ORDER — BLOOD-GLUCOSE SENSOR
1 EACH MISCELLANEOUS
Qty: 6 EACH | Refills: 1 | Status: SHIPPED | OUTPATIENT
Start: 2023-08-29

## 2023-10-11 ENCOUNTER — PRIOR AUTHORIZATION (OUTPATIENT)
Dept: ENDOCRINOLOGY | Age: 48
End: 2023-10-11
Payer: COMMERCIAL

## 2023-10-11 NOTE — TELEPHONE ENCOUNTER
RECEIVED PA REQUEST FOR OZEMPIC.     THIS IS HARI AND DR. PARADA'S PATIENT BUT YOUR NAME IS ON THE SCRIPT. DO WE NEED TO CALL THE PHARAMCY TO CHANGE THE NAME ON THE ORDER?

## 2023-11-14 DIAGNOSIS — E11.65 TYPE 2 DIABETES MELLITUS WITH HYPERGLYCEMIA, WITH LONG-TERM CURRENT USE OF INSULIN: Primary | ICD-10-CM

## 2023-11-14 DIAGNOSIS — Z79.4 TYPE 2 DIABETES MELLITUS WITH HYPERGLYCEMIA, WITH LONG-TERM CURRENT USE OF INSULIN: Primary | ICD-10-CM

## 2023-11-14 RX ORDER — DAPAGLIFLOZIN AND METFORMIN HYDROCHLORIDE 5; 1000 MG/1; MG/1
2 TABLET, FILM COATED, EXTENDED RELEASE ORAL DAILY
Qty: 180 TABLET | Refills: 1 | Status: SHIPPED | OUTPATIENT
Start: 2023-11-14

## 2023-11-15 ENCOUNTER — PRIOR AUTHORIZATION (OUTPATIENT)
Dept: ENDOCRINOLOGY | Age: 48
End: 2023-11-15
Payer: COMMERCIAL

## 2023-11-15 NOTE — TELEPHONE ENCOUNTER
A PA has been started for Xigduo XR 5-1000MG er tablets.  Key: [id number] - PA Case ID: [id number] - Rx #: [id number].  PA came back stating no PA needed, called pharmacy to let them know, and the pharmacy stated the medication is showing that a PA is needed. Will call insurance company.

## 2023-11-17 ENCOUNTER — TELEPHONE (OUTPATIENT)
Dept: ENDOCRINOLOGY | Age: 48
End: 2023-11-17
Payer: COMMERCIAL

## 2023-11-20 ENCOUNTER — TELEPHONE (OUTPATIENT)
Dept: ENDOCRINOLOGY | Age: 48
End: 2023-11-20
Payer: COMMERCIAL

## 2023-11-20 NOTE — TELEPHONE ENCOUNTER
Spoke with pt to let him know that he is needing to call his insurance and get pharmacy benefits added to his account. Based on when I called his insurance company that is what they had told me to advise the pt to do.

## 2023-11-20 NOTE — TELEPHONE ENCOUNTER
Called and spoke with pt, I was able to call and do a PA over the phone, it was approved.   Case ID number is [id number]   Approved until 11-.

## 2023-11-28 ENCOUNTER — TELEPHONE (OUTPATIENT)
Dept: ENDOCRINOLOGY | Age: 48
End: 2023-11-28
Payer: COMMERCIAL

## 2023-11-28 DIAGNOSIS — E11.65 TYPE 2 DIABETES MELLITUS WITH HYPERGLYCEMIA, WITH LONG-TERM CURRENT USE OF INSULIN: ICD-10-CM

## 2023-11-28 DIAGNOSIS — Z79.4 TYPE 2 DIABETES MELLITUS WITH HYPERGLYCEMIA, WITH LONG-TERM CURRENT USE OF INSULIN: ICD-10-CM

## 2023-11-28 RX ORDER — DAPAGLIFLOZIN AND METFORMIN HYDROCHLORIDE 5; 1000 MG/1; MG/1
2 TABLET, FILM COATED, EXTENDED RELEASE ORAL DAILY
Qty: 180 TABLET | Refills: 1 | Status: SHIPPED | OUTPATIENT
Start: 2023-11-28

## 2023-11-28 NOTE — TELEPHONE ENCOUNTER
Caller: [facility]/pharmacy #[id number] - [place], [place] - [address] AT IN THE [facility] [phone] [facility] [phone] FX    Relationship: Pharmacy    Best call back number: [phone]    Requested Prescriptions: Xigduo XR 5-1000 MG tablet [[id number]] (Order [id number])  Requested Prescriptions      No prescriptions requested or ordered in this encounter        Pharmacy where request should be sent:  [facility]/pharmacy #[id number] - [place], [place] - [address] AT IN THE [facility] [phone] [facility] [phone]   [address] [place] [address]  Phone: [phone]  Fax: [phone]      Last office visit with prescribing clinician: 1/5/2023   Last telemedicine visit with prescribing clinician: Visit date not found   Next office visit with prescribing clinician: 12/19/2023     Additional details provided by patient: PER MARIE AT THE PHARMACY THE SCRIPT WAS DELETED BY ACCIDENT AND NEEDS A NEW SCRIPT SENT OVER TO THEM TODAY    Does the patient have less than a 3 day supply:  [x] Yes  [] No    Would you like a call back once the refill request has been completed: [x] Yes [] No    If the office needs to give you a call back, can they leave a voicemail: [x] Yes [] No    Fransico River Rep   11/28/23 12:47 EST

## 2023-12-16 DIAGNOSIS — Z79.4 TYPE 2 DIABETES MELLITUS WITH HYPERGLYCEMIA, WITH LONG-TERM CURRENT USE OF INSULIN: ICD-10-CM

## 2023-12-16 DIAGNOSIS — E11.65 TYPE 2 DIABETES MELLITUS WITH HYPERGLYCEMIA, WITH LONG-TERM CURRENT USE OF INSULIN: ICD-10-CM

## 2023-12-18 RX ORDER — BLOOD-GLUCOSE SENSOR
1 EACH MISCELLANEOUS
Qty: 6 EACH | Refills: 1 | Status: SHIPPED | OUTPATIENT
Start: 2023-12-18 | End: 2023-12-19 | Stop reason: SDUPTHER

## 2023-12-18 NOTE — TELEPHONE ENCOUNTER
Rx Refill Note  Requested Prescriptions     Pending Prescriptions Disp Refills    Continuous Blood Gluc Sensor (FreeStyle Clifton 3 Sensor) misc [Pharmacy Med Name: FREESTYLE CLIFTON 3 SENSOR] 6 each 1     Sig: USE 1 EACH EVERY 14 (FOURTEEN) DAYS. INDICATIONS: TYPE 2 DIABETES      Last office visit with prescribing clinician: 8/21/2023   Last telemedicine visit with prescribing clinician: Visit date not found   Next office visit with prescribing clinician: Visit date not found                         Would you like a call back once the refill request has been completed: [] Yes [] No    If the office needs to give you a call back, can they leave a voicemail: [] Yes [] No    Chiquis Jarvis  12/18/23, 07:42 EST

## 2023-12-19 ENCOUNTER — OFFICE VISIT (OUTPATIENT)
Dept: ENDOCRINOLOGY | Age: 48
End: 2023-12-19
Payer: COMMERCIAL

## 2023-12-19 VITALS
SYSTOLIC BLOOD PRESSURE: 96 MMHG | WEIGHT: 185.6 LBS | HEART RATE: 67 BPM | OXYGEN SATURATION: 98 % | TEMPERATURE: 97 F | DIASTOLIC BLOOD PRESSURE: 60 MMHG | HEIGHT: 70 IN | BODY MASS INDEX: 26.57 KG/M2

## 2023-12-19 DIAGNOSIS — I10 ESSENTIAL HYPERTENSION: ICD-10-CM

## 2023-12-19 DIAGNOSIS — R80.9 TYPE 2 DIABETES MELLITUS WITH MICROALBUMINURIA, WITH LONG-TERM CURRENT USE OF INSULIN: Primary | ICD-10-CM

## 2023-12-19 DIAGNOSIS — E11.65 TYPE 2 DIABETES MELLITUS WITH HYPERGLYCEMIA, WITH LONG-TERM CURRENT USE OF INSULIN: ICD-10-CM

## 2023-12-19 DIAGNOSIS — E55.9 VITAMIN D DEFICIENCY: ICD-10-CM

## 2023-12-19 DIAGNOSIS — Z79.4 TYPE 2 DIABETES MELLITUS WITH HYPERGLYCEMIA, WITH LONG-TERM CURRENT USE OF INSULIN: ICD-10-CM

## 2023-12-19 DIAGNOSIS — E78.49 OTHER HYPERLIPIDEMIA: ICD-10-CM

## 2023-12-19 DIAGNOSIS — E11.29 TYPE 2 DIABETES MELLITUS WITH MICROALBUMINURIA, WITH LONG-TERM CURRENT USE OF INSULIN: Primary | ICD-10-CM

## 2023-12-19 DIAGNOSIS — Z79.4 TYPE 2 DIABETES MELLITUS WITH MICROALBUMINURIA, WITH LONG-TERM CURRENT USE OF INSULIN: Primary | ICD-10-CM

## 2023-12-19 PROCEDURE — 99214 OFFICE O/P EST MOD 30 MIN: CPT | Performed by: INTERNAL MEDICINE

## 2023-12-19 PROCEDURE — 95251 CONT GLUC MNTR ANALYSIS I&R: CPT | Performed by: INTERNAL MEDICINE

## 2023-12-19 RX ORDER — BLOOD-GLUCOSE SENSOR
1 EACH MISCELLANEOUS
Qty: 6 EACH | Refills: 3 | Status: SHIPPED | OUTPATIENT
Start: 2023-12-19

## 2023-12-19 RX ORDER — INSULIN GLARGINE 300 U/ML
66 INJECTION, SOLUTION SUBCUTANEOUS EVERY MORNING
Qty: 18 ML | Refills: 1 | Status: SHIPPED | OUTPATIENT
Start: 2023-12-19

## 2023-12-19 NOTE — PROGRESS NOTES
Subjective   Gonzalo Mcduffie is a 48 y.o. male.     History of Present Illness     He has known diabetes mellitus since 2002 and started on insulin in 2016.  He is on Toujeo 60 units every morning, Ozempic 2 mg weekly, and Xigduo XR 5/1000 mg 2 tablets once a day.  He missed Xigduo for a week in 11/23.  He admits to dietary noncompliance and eats out often.  He uses a freestyle miguel angel 3 sensor.  He has no significant weight change since August 2023.  He has no significant weight change since August 2023.  His last meal was last night.     Sensor data from December 6, 2023 to December 19, 2023 reviewed.  Average glucose 267 mg per DL.  GMI 9.7%.  Time in target 11%.  Time above target 89%.  Time below target 0.  He has no early morning hypoglycemia.  Fasting glucoses 150 or higher.  He has hyperglycemia throughout the day and night.     His last eye examination was in December 2022.  He denies retinopathy.  He denies numbness, tingling or burning in his hands or feet.  Urine microalbumin was elevated in December 2022.  He is on losartan     He has hyperlipidemia and is on Lipitor 40 mg/day.  He denies myalgia.      He has hypertension is on losartan 25 mg once a day.  He has no previous history of heart attack or stroke.  He denies chest pain or shortness of breath.       He has history of vitamin D deficiency and is on vitamin D3  2000 units daily.      He has history of kidney stones and had previous lithotripsies.  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     He had multiple colon polyps removed in July 2020 by Dr. Brito.  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  He denies bowel complaints.  His father had colon cancer in his 70s.  He will have colonoscopy in 2024.    The following portions of the patient's history were reviewed and updated as appropriate: allergies, current medications, past family history, past medical history, past social history, past surgical history,  "and problem list.    Review of Systems   Eyes:  Negative for visual disturbance.   Respiratory:  Negative for shortness of breath and wheezing.    Cardiovascular:  Negative for chest pain and palpitations.   Gastrointestinal: Negative.    Genitourinary: Negative.    Musculoskeletal:  Negative for myalgias.   Neurological:  Negative for numbness.     Vitals:    12/19/23 1003   BP: 96/60   Pulse: 67   Temp: 97 °F (36.1 °C)   TempSrc: Temporal   SpO2: 98%   Weight: 84.2 kg (185 lb 9.6 oz)   Height: 177.8 cm (70\")      Objective   Physical Exam  Constitutional:       General: He is not in acute distress.     Appearance: Normal appearance. He is not ill-appearing, toxic-appearing or diaphoretic.   Eyes:      General: No scleral icterus.        Right eye: No discharge.         Left eye: No discharge.      Extraocular Movements: Extraocular movements intact.      Conjunctiva/sclera: Conjunctivae normal.   Neck:      Vascular: No carotid bruit.   Cardiovascular:      Rate and Rhythm: Normal rate and regular rhythm.      Heart sounds: Normal heart sounds. No murmur heard.     No friction rub.   Pulmonary:      Breath sounds: Normal breath sounds. No rales.   Chest:      Chest wall: No tenderness.   Abdominal:      General: Bowel sounds are normal.      Palpations: Abdomen is soft.      Tenderness: There is no right CVA tenderness or left CVA tenderness.   Musculoskeletal:      Right lower leg: No edema.      Left lower leg: No edema.   Lymphadenopathy:      Cervical: No cervical adenopathy.   Skin:     General: Skin is warm.   Neurological:      Mental Status: He is alert and oriented to person, place, and time.   Psychiatric:         Mood and Affect: Mood normal.         Behavior: Behavior normal.       Office Visit on 08/21/2023   Component Date Value Ref Range Status    Hemoglobin A1C 08/21/2023 7.00 (H)  4.80 - 5.60 % Final    Comment: Hemoglobin A1C Ranges:  Increased Risk for Diabetes  5.7% to 6.4%  Diabetes            "          >= 6.5%  Diabetic Goal                < 7.0%      Glucose 08/21/2023 124 (H)  65 - 99 mg/dL Final    BUN 08/21/2023 21 (H)  6 - 20 mg/dL Final    Creatinine 08/21/2023 1.03  0.76 - 1.27 mg/dL Final    EGFR Result 08/21/2023 89.6  >60.0 mL/min/1.73 Final    Comment: GFR Normal >60  Chronic Kidney Disease <60  Kidney Failure <15      BUN/Creatinine Ratio 08/21/2023 20.4  7.0 - 25.0 Final    Sodium 08/21/2023 142  136 - 145 mmol/L Final    Potassium 08/21/2023 4.2  3.5 - 5.2 mmol/L Final    Chloride 08/21/2023 104  98 - 107 mmol/L Final    Total CO2 08/21/2023 24.4  22.0 - 29.0 mmol/L Final    Calcium 08/21/2023 9.9  8.6 - 10.5 mg/dL Final    Total Protein 08/21/2023 6.8  6.0 - 8.5 g/dL Final    Albumin 08/21/2023 4.5  3.5 - 5.2 g/dL Final    Globulin 08/21/2023 2.3  gm/dL Final    A/G Ratio 08/21/2023 2.0  g/dL Final    Total Bilirubin 08/21/2023 0.5  0.0 - 1.2 mg/dL Final    Alkaline Phosphatase 08/21/2023 84  39 - 117 U/L Final    AST (SGOT) 08/21/2023 13  1 - 40 U/L Final    ALT (SGPT) 08/21/2023 24  1 - 41 U/L Final    Total Cholesterol 08/21/2023 125  0 - 200 mg/dL Final    Comment: Cholesterol Reference Ranges  (U.S. Department of Health and Human Services ATP III  Classifications)  Desirable          <200 mg/dL  Borderline High    200-239 mg/dL  High Risk          >240 mg/dL  Triglyceride Reference Ranges  (U.S. Department of Health and Human Services ATP III  Classifications)  Normal           <150 mg/dL  Borderline High  150-199 mg/dL  High             200-499 mg/dL  Very High        >500 mg/dL  HDL Reference Ranges  (U.S. Department of Health and Human Services ATP III  Classifications)  Low     <40 mg/dl (major risk factor for CHD)  High    >60 mg/dl ('negative' risk factor for CHD)  LDL Reference Ranges  (U.S. Department of Health and Human Services ATP III  Classifications)  Optimal          <100 mg/dL  Near Optimal     100-129 mg/dL  Borderline High  130-159 mg/dL  High             160-189  mg/dL  Very High        >189 mg/dL      Triglycerides 08/21/2023 127  0 - 150 mg/dL Final    HDL Cholesterol 08/21/2023 31 (L)  40 - 60 mg/dL Final    VLDL Cholesterol Javier 08/21/2023 23  5 - 40 mg/dL Final    LDL Chol Calc (NIH) 08/21/2023 71  0 - 100 mg/dL Final    TSH 08/21/2023 1.160  0.270 - 4.200 uIU/mL Final    Interpretation 08/21/2023 Note   Final    Supplemental report is available.     Assessment & Plan   Diagnoses and all orders for this visit:    1. Type 2 diabetes mellitus with microalbuminuria, with long-term current use of insulin (Primary)  -     Comprehensive Metabolic Panel  -     Hemoglobin A1c  -     C-Peptide  -     Glutamic Acid Decarboxylase  -     IA-2 Autoantibodies  -     ZNT8 Antibodies  -     Microalbumin / Creatinine Urine Ratio - Urine, Clean Catch    2. Other hyperlipidemia  -     Comprehensive Metabolic Panel  -     Lipid Panel    3. Essential hypertension  -     Comprehensive Metabolic Panel    4. Vitamin D deficiency  -     Comprehensive Metabolic Panel  -     Vitamin D,25-Hydroxy    5. Type 2 diabetes mellitus with hyperglycemia, with long-term current use of insulin  -     Insulin Glargine, 1 Unit Dial, (Toujeo SoloStar) 300 UNIT/ML solution pen-injector injection; Inject 66 Units under the skin into the appropriate area as directed Every Morning. Indications: Type 2 Diabetes  Dispense: 18 mL; Refill: 1  -     Continuous Blood Gluc Sensor (FreeStyle Clifton 3 Sensor) misc; Use 1 each Every 14 (Fourteen) Days. E11.29  Indications: Type 2 Diabetes  Dispense: 6 each; Refill: 3      InCrease Toujeo to 66 every morning.  Continue Ozempic 2 mg weekly and Xigduo XR 5/1000 mg 2 tablets daily.  Check C-peptide, JUSTINE antibody, IA 2 antibody, ZNT8 antibodies and urine microalbumin.  Sensor download next month.    Continue Lipitor 40 mg/day.    Continue losartan 25 mg/day.    Continue vitamin D3 2000 units/day.    Follow-up with Dr. Bergman and Dr. Brito as scheduled.    Copy of my note sent to  Dr. Ramirez, Dr. Bergman and Dr. Brito.    RTC 3 mos with DAVIDE Keith NP

## 2023-12-24 LAB
25(OH)D3+25(OH)D2 SERPL-MCNC: 25.1 NG/ML (ref 30–100)
ALBUMIN SERPL-MCNC: 4.7 G/DL (ref 4.1–5.1)
ALBUMIN/CREAT UR: 34 MG/G CREAT (ref 0–29)
ALBUMIN/GLOB SERPL: 1.6 {RATIO} (ref 1.2–2.2)
ALP SERPL-CCNC: 86 IU/L (ref 44–121)
ALT SERPL-CCNC: 45 IU/L (ref 0–44)
AST SERPL-CCNC: 27 IU/L (ref 0–40)
BILIRUB SERPL-MCNC: 0.8 MG/DL (ref 0–1.2)
BUN SERPL-MCNC: 16 MG/DL (ref 6–24)
BUN/CREAT SERPL: 17 (ref 9–20)
C PEPTIDE SERPL-MCNC: 2.2 NG/ML (ref 1.1–4.4)
CALCIUM SERPL-MCNC: 9.9 MG/DL (ref 8.7–10.2)
CHLORIDE SERPL-SCNC: 99 MMOL/L (ref 96–106)
CHOLEST SERPL-MCNC: 208 MG/DL (ref 100–199)
CO2 SERPL-SCNC: 23 MMOL/L (ref 20–29)
CREAT SERPL-MCNC: 0.92 MG/DL (ref 0.76–1.27)
CREAT UR-MCNC: 49.9 MG/DL
EGFRCR SERPLBLD CKD-EPI 2021: 102 ML/MIN/1.73
GAD65 AB SER IA-ACNC: <5 U/ML (ref 0–5)
GLOBULIN SER CALC-MCNC: 2.9 G/DL (ref 1.5–4.5)
GLUCOSE SERPL-MCNC: 150 MG/DL (ref 70–99)
HBA1C MFR BLD: 8.4 % (ref 4.8–5.6)
HDLC SERPL-MCNC: 33 MG/DL
IMP & REVIEW OF LAB RESULTS: NORMAL
ISLET CELL512 AB SER-ACNC: <7.5 U/ML
LDLC SERPL CALC-MCNC: 146 MG/DL (ref 0–99)
MICROALBUMIN UR-MCNC: 17.1 UG/ML
POTASSIUM SERPL-SCNC: 4.3 MMOL/L (ref 3.5–5.2)
PROT SERPL-MCNC: 7.6 G/DL (ref 6–8.5)
SODIUM SERPL-SCNC: 141 MMOL/L (ref 134–144)
TRIGL SERPL-MCNC: 158 MG/DL (ref 0–149)
VLDLC SERPL CALC-MCNC: 29 MG/DL (ref 5–40)
ZNT8 AB SERPL IA-ACNC: <15 U/ML

## 2023-12-25 RX ORDER — ACETAMINOPHEN 160 MG
TABLET,DISINTEGRATING ORAL
Qty: 180 CAPSULE | Refills: 2 | Status: SHIPPED | OUTPATIENT
Start: 2023-12-25

## 2023-12-25 RX ORDER — ATORVASTATIN CALCIUM 80 MG/1
80 TABLET, FILM COATED ORAL DAILY
Qty: 90 TABLET | Refills: 1 | Status: SHIPPED | OUTPATIENT
Start: 2023-12-25

## 2023-12-25 NOTE — PROGRESS NOTES
Vitamin D insufficient on vitamin D3 2000 units/day.  Increase vitamin D3 2000 units to 2 capsules a day.  Hemoglobin A1c higher at 8.4%.  Take medications regularly and stay on diet.  C-peptide 2.2 ng/ML.  Patient is still making some insulin on his own.    JUSTINE antibody negative.  Normal IA 2 antibodies.  Normal ZNT8 antibodies.  Urine microalbumin mildly elevated.  Continue losartan.  .  HDL 33.  Triglycerides 158.  LDL high on Lipitor 40 mg a day.  Increase Lipitor to 80 mg/day.  Prescription sent to pharmacy.  Copy of labs sent to Dr. Ramirez.  Please notify patient results and instructions.

## 2023-12-26 ENCOUNTER — TELEPHONE (OUTPATIENT)
Dept: ENDOCRINOLOGY | Age: 48
End: 2023-12-26
Payer: COMMERCIAL

## 2023-12-26 NOTE — TELEPHONE ENCOUNTER
12/26 called and lm for pt to call reg his labs   Ok for hub to read to patient   Please schedule labs if needed or follow ups  Ok for  to read to patient   Please schedule labs if needed or follow ups       ----- Message from Benton Coker MD sent at 12/25/2023  1:20 PM EST -----  Vitamin D insufficient on vitamin D3 2000 units/day.  Increase vitamin D3 2000 units to 2 capsules a day.  Hemoglobin A1c higher at 8.4%.  Take medications regularly and stay on diet.  C-peptide 2.2 ng/ML.  Patient is still making some insulin on his own.    JUSTINE antibody negative.  Normal IA 2 antibodies.  Normal ZNT8 antibodies.  Urine microalbumin mildly elevated.  Continue losartan.  .  HDL 33.  Triglycerides 158.  LDL high on Lipitor 40 mg a day.  Increase Lipitor to 80 mg/day.  Prescription sent to pharmacy.

## 2024-02-02 RX ORDER — PEN NEEDLE, DIABETIC 31 GX5/16"
1 NEEDLE, DISPOSABLE MISCELLANEOUS DAILY
Qty: 100 EACH | Refills: 3 | Status: SHIPPED | OUTPATIENT
Start: 2024-02-02

## 2024-02-02 NOTE — TELEPHONE ENCOUNTER
Rx Refill Note  Requested Prescriptions     Pending Prescriptions Disp Refills    B-D ULTRAFINE III SHORT PEN 31G X 8 MM misc [Pharmacy Med Name: BD UF SHORT PEN NEEDLE [id number]] 100 each 3     Si EACH BY OTHER ROUTE DAILY.      Last office visit with prescribing clinician: 2023   Last telemedicine visit with prescribing clinician: Visit date not found   Next office visit with prescribing clinician: Visit date not found                         Would you like a call back once the refill request has been completed: [] Yes [] No    If the office needs to give you a call back, can they leave a voicemail: [] Yes [] No    Rina Jarvis MA  24, 07:50 EST

## 2024-02-05 DIAGNOSIS — E11.65 TYPE 2 DIABETES MELLITUS WITH HYPERGLYCEMIA, WITH LONG-TERM CURRENT USE OF INSULIN: ICD-10-CM

## 2024-02-05 DIAGNOSIS — Z79.4 TYPE 2 DIABETES MELLITUS WITH HYPERGLYCEMIA, WITH LONG-TERM CURRENT USE OF INSULIN: ICD-10-CM

## 2024-02-05 RX ORDER — INSULIN GLARGINE 300 U/ML
INJECTION, SOLUTION SUBCUTANEOUS
Qty: 18 ML | Refills: 1 | Status: SHIPPED | OUTPATIENT
Start: 2024-02-05

## 2024-02-05 RX ORDER — SEMAGLUTIDE 2.68 MG/ML
2 INJECTION, SOLUTION SUBCUTANEOUS WEEKLY
Qty: 9 ML | Refills: 1 | Status: SHIPPED | OUTPATIENT
Start: 2024-02-05

## 2024-02-05 NOTE — TELEPHONE ENCOUNTER
Rx Refill Note  Requested Prescriptions     Pending Prescriptions Disp Refills    Toujeo SoloStar 300 UNIT/ML solution pen-injector injection [Pharmacy Med Name: TOUJEO SOLOSTAR 300 UNIT/ML] 18 mL 1     Sig: INJECT 60 UNITS UNDER THE SKIN INTO THE APPROPRIATE AREA AS DIRECTED EVERY MORNING. INDICATIONS: TYPE 2 DIABETES    Ozempic, 2 MG/DOSE, 8 MG/3ML solution pen-injector [Pharmacy Med Name: OZEMPIC 8 MG/3 ML (2 MG/DOSE)] 9 mL 1     Sig: INJECT 2 MG UNDER THE SKIN INTO THE APPROPRIATE AREA AS DIRECTED 1 (ONE) TIME PER WEEK.      Last office visit with prescribing clinician: 8/21/2023   Last telemedicine visit with prescribing clinician: Visit date not found   Next office visit with prescribing clinician: 3/19/2024                         Would you like a call back once the refill request has been completed: [] Yes [] No    If the office needs to give you a call back, can they leave a voicemail: [] Yes [] No    Rina Jarvis MA  02/05/24, 11:12 EST

## 2024-04-19 ENCOUNTER — OFFICE VISIT (OUTPATIENT)
Dept: ENDOCRINOLOGY | Age: 49
End: 2024-04-19
Payer: COMMERCIAL

## 2024-04-19 VITALS
OXYGEN SATURATION: 97 % | WEIGHT: 181.6 LBS | TEMPERATURE: 96.9 F | BODY MASS INDEX: 26 KG/M2 | HEIGHT: 70 IN | HEART RATE: 81 BPM | DIASTOLIC BLOOD PRESSURE: 86 MMHG | SYSTOLIC BLOOD PRESSURE: 124 MMHG

## 2024-04-19 DIAGNOSIS — Z79.4 TYPE 2 DIABETES MELLITUS WITH HYPERGLYCEMIA, WITH LONG-TERM CURRENT USE OF INSULIN: Primary | ICD-10-CM

## 2024-04-19 DIAGNOSIS — E78.49 OTHER HYPERLIPIDEMIA: ICD-10-CM

## 2024-04-19 DIAGNOSIS — E11.65 TYPE 2 DIABETES MELLITUS WITH HYPERGLYCEMIA, WITH LONG-TERM CURRENT USE OF INSULIN: Primary | ICD-10-CM

## 2024-04-19 DIAGNOSIS — E55.9 VITAMIN D DEFICIENCY: ICD-10-CM

## 2024-04-19 DIAGNOSIS — I10 ESSENTIAL HYPERTENSION: ICD-10-CM

## 2024-04-19 RX ORDER — INSULIN GLARGINE 300 U/ML
74 INJECTION, SOLUTION SUBCUTANEOUS DAILY
Start: 2024-04-19

## 2024-04-19 NOTE — PATIENT INSTRUCTIONS
Increase Toujeo 74 units every morning  Continue with Ozempic 2 mg weekly   Continue with  Xigduo XR 5/1,000 mg 2 tablets daily

## 2024-04-19 NOTE — PROGRESS NOTES
Chief Complaint  Chief Complaint   Patient presents with    Diabetes     Type 2: Pt clifton is attached, is up to date on eye exam, no hx of retinopathy or neuropathy.        Subjective          History of Present Illness    Gonzalo Mcduffie 48 y.o. presents for a follow-up evaluation for type 2 DM     He has been diabetic since 2002 and started insulin in 2016     Pt is on the following medications for their DM: Toujeo 66 units every morning, Ozempic 2 mg weekly and Xigduo XR 5/1,000 mg 2 tablets daily     Pt complains of constipation, diarrhea, chest pain, shortness of breath, numbness and tingling in feet/hands and vision changes.    Denies diarrhea, constipation, chest pain, shortness of breath, vision changes or numbness and tingling in feet/hands.    Pt denies a history of diabetic retinopathy.  Last eye exam was 2023    Pt does have a history of nephropathy.  Patient is currently taking ARB     Pt does have neuropathy.    Pt denies a history of CAD or CVA.    Last A1C in 12/23 was 8.4    Last microalbumin in 12/23 was positive          Blood Sugars    Blood glucoses are checked via Clifton.    Fasting blood glucoses: high 100s    Pre-meal blood glucoses: high 100s-200s    Pt has rare episodes of hypoglycemia.        Sensor Data    Time in range 30%  High 56%  Very high 14%  Low 0%  Very low 0%      Average Glucose - 204 mg/dL      Sensor Wear - 50 %            Hyperlipidemia     Pt is currently taking atorvastatin 80 mg HS     Last lipid panel in 12/23 showed Total 208, HDL 33,  and Triglycerides 158            Hypertension    Pt denies any chest pain, palpitations, shortness of breath or headache    Current regimen includes losartan 25 mg daily                  Vitamin D Deficiency     Current treatment includes 4,000 units daily     Last Vit D level was 25.1 in 12/23                 Other History     He has history of kidney stones and had previous lithotripsies.  He had surgery for urethral cancer done  "by Dr. Bergman.     He had multiple colon polyps removed in July 2020 by Dr. Brito.  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    Objective   Vital Signs:   /86   Pulse 81   Temp 96.9 °F (36.1 °C) (Temporal)   Ht 177.8 cm (70\")   Wt 82.4 kg (181 lb 9.6 oz)   SpO2 97%   BMI 26.06 kg/m²       Physical Exam   Physical Exam  Constitutional:       General: He is not in acute distress.     Appearance: Normal appearance. He is not diaphoretic.   HENT:      Head: Normocephalic and atraumatic.   Eyes:      General:         Right eye: No discharge.         Left eye: No discharge.   Skin:     General: Skin is warm and dry.   Neurological:      Mental Status: He is alert.   Psychiatric:         Mood and Affect: Mood normal.         Behavior: Behavior normal.                    Results Review:   Hemoglobin A1C   Date Value Ref Range Status   12/19/2023 8.4 (H) 4.8 - 5.6 % Final     Comment:              Prediabetes: 5.7 - 6.4           Diabetes: >6.4           Glycemic control for adults with diabetes: <7.0       Triglycerides   Date Value Ref Range Status   12/19/2023 158 (H) 0 - 149 mg/dL Final     HDL Cholesterol   Date Value Ref Range Status   12/19/2023 33 (L) >39 mg/dL Final     LDL Chol Calc (NIH)   Date Value Ref Range Status   12/19/2023 146 (H) 0 - 99 mg/dL Final     VLDL Cholesterol Javier   Date Value Ref Range Status   12/19/2023 29 5 - 40 mg/dL Final         Assessment and Plan {CC Problem List  Visit Diagnosis  ROS  Review (Popup)  Health Maintenance  Quality  BestPractice  Medications  SmartSets  SnapShot Encounters  Media :23  Diagnoses and all orders for this visit:    1. Type 2 diabetes mellitus with hyperglycemia, with long-term current use of insulin (Primary)  -     Insulin Glargine, 1 Unit Dial, (Toujeo SoloStar) 300 UNIT/ML solution pen-injector injection; " Inject 74 Units under the skin into the appropriate area as directed Daily.  -     Hemoglobin A1c  -     Comprehensive Metabolic Panel    Elevated morning blood glucoses noted on Clifton  Increase Toujeo 74 units every morning  Continue with Ozempic 2 mg weekly   Continue with  Xigduo XR 5/1,000 mg 2 tablets daily   Continue with Clifton      2. Other hyperlipidemia  -     Comprehensive Metabolic Panel  -     Lipid Panel    Check labs today  Continue with statin      3. Essential hypertension  -     Comprehensive Metabolic Panel    Stable  Continue with current medication regimen  Defer management to PCP       4. Vitamin D deficiency  -     Vitamin D,25-Hydroxy    Check labs today  Continue with supplement      No refills needed at this time      Labs today  RTC in 3 months with me and 6 months with Dr. Coker      Follow Up     Patient was given instructions and counseling regarding her condition or for health maintenance advice. Please see specific information pulled into the AVS if appropriate.                Kori Keith, APRN  04/19/24

## 2024-04-29 ENCOUNTER — TELEPHONE (OUTPATIENT)
Dept: GASTROENTEROLOGY | Facility: CLINIC | Age: 49
End: 2024-04-29
Payer: COMMERCIAL

## 2024-04-29 NOTE — TELEPHONE ENCOUNTER
LAST C/S  5/4/21  IN [facility]     PERSONAL HX OF POLYPS     FAMILY HX OF POLYPS     FAMILY HX OF COLON CA    NO ASA OR BLOOD THINNERS        LIST OF  MEDICATIONS    OZEMPIC  TOUJEO  XIGDUO  ATORVASTATIN   VITAMIN D 3  LOSARTAN          OA QUESTIONNAIRE SCANNED IN MEDIA

## 2024-04-30 ENCOUNTER — PREP FOR SURGERY (OUTPATIENT)
Dept: OTHER | Facility: HOSPITAL | Age: 49
End: 2024-04-30
Payer: COMMERCIAL

## 2024-04-30 DIAGNOSIS — D12.3 ADENOMATOUS POLYP OF TRANSVERSE COLON: Primary | ICD-10-CM

## 2024-05-01 ENCOUNTER — TELEPHONE (OUTPATIENT)
Dept: GASTROENTEROLOGY | Facility: CLINIC | Age: 49
End: 2024-05-01
Payer: COMMERCIAL

## 2024-05-01 NOTE — TELEPHONE ENCOUNTER
[facility] - FOR PSC WITH PROVIDER - PSC WILL CALL WITH ARRIVE TIME A WEEK PRIOR - DATE IS 05/28/2024

## 2024-05-28 ENCOUNTER — OUTSIDE FACILITY SERVICE (OUTPATIENT)
Dept: GASTROENTEROLOGY | Facility: CLINIC | Age: 49
End: 2024-05-28
Payer: COMMERCIAL

## 2024-05-28 ENCOUNTER — LAB REQUISITION (OUTPATIENT)
Dept: LAB | Facility: HOSPITAL | Age: 49
End: 2024-05-28
Payer: COMMERCIAL

## 2024-05-28 DIAGNOSIS — K63.5 POLYP OF COLON, UNSPECIFIED PART OF COLON, UNSPECIFIED TYPE: ICD-10-CM

## 2024-05-28 PROCEDURE — 88305 TISSUE EXAM BY PATHOLOGIST: CPT | Performed by: INTERNAL MEDICINE

## 2024-05-29 LAB
LAB AP CASE REPORT: NORMAL
PATH REPORT.FINAL DX SPEC: NORMAL
PATH REPORT.GROSS SPEC: NORMAL

## 2024-05-31 ENCOUNTER — LAB (OUTPATIENT)
Facility: HOSPITAL | Age: 49
End: 2024-05-31
Payer: COMMERCIAL

## 2024-05-31 LAB
25(OH)D3 SERPL-MCNC: 27.8 NG/ML (ref 30–100)
ALBUMIN SERPL-MCNC: 4.4 G/DL (ref 3.5–5.2)
ALBUMIN/GLOB SERPL: 1.4 G/DL
ALP SERPL-CCNC: 80 U/L (ref 39–117)
ALT SERPL W P-5'-P-CCNC: 52 U/L (ref 1–41)
ANION GAP SERPL CALCULATED.3IONS-SCNC: 10.9 MMOL/L (ref 5–15)
AST SERPL-CCNC: 28 U/L (ref 1–40)
BILIRUB SERPL-MCNC: 0.5 MG/DL (ref 0–1.2)
BUN SERPL-MCNC: 8 MG/DL (ref 6–20)
BUN/CREAT SERPL: 9 (ref 7–25)
CALCIUM SPEC-SCNC: 10.4 MG/DL (ref 8.6–10.5)
CHLORIDE SERPL-SCNC: 103 MMOL/L (ref 98–107)
CHOLEST SERPL-MCNC: 153 MG/DL (ref 0–200)
CO2 SERPL-SCNC: 27.1 MMOL/L (ref 22–29)
CREAT SERPL-MCNC: 0.89 MG/DL (ref 0.76–1.27)
EGFRCR SERPLBLD CKD-EPI 2021: 105.1 ML/MIN/1.73
GLOBULIN UR ELPH-MCNC: 3.1 GM/DL
GLUCOSE SERPL-MCNC: 132 MG/DL (ref 65–99)
HBA1C MFR BLD: 8.6 % (ref 4.8–5.6)
HDLC SERPL-MCNC: 42 MG/DL (ref 40–60)
LDLC SERPL CALC-MCNC: 94 MG/DL (ref 0–100)
LDLC/HDLC SERPL: 2.22 {RATIO}
POTASSIUM SERPL-SCNC: 4 MMOL/L (ref 3.5–5.2)
PROT SERPL-MCNC: 7.5 G/DL (ref 6–8.5)
SODIUM SERPL-SCNC: 141 MMOL/L (ref 136–145)
TRIGL SERPL-MCNC: 89 MG/DL (ref 0–150)
VLDLC SERPL-MCNC: 17 MG/DL (ref 5–40)

## 2024-05-31 PROCEDURE — 82306 VITAMIN D 25 HYDROXY: CPT | Performed by: NURSE PRACTITIONER

## 2024-05-31 PROCEDURE — 80053 COMPREHEN METABOLIC PANEL: CPT | Performed by: NURSE PRACTITIONER

## 2024-05-31 PROCEDURE — 83036 HEMOGLOBIN GLYCOSYLATED A1C: CPT | Performed by: NURSE PRACTITIONER

## 2024-05-31 PROCEDURE — 80061 LIPID PANEL: CPT | Performed by: NURSE PRACTITIONER

## 2024-07-19 ENCOUNTER — OFFICE VISIT (OUTPATIENT)
Dept: ENDOCRINOLOGY | Age: 49
End: 2024-07-19
Payer: COMMERCIAL

## 2024-07-19 VITALS
WEIGHT: 171 LBS | TEMPERATURE: 96.9 F | BODY MASS INDEX: 24.48 KG/M2 | DIASTOLIC BLOOD PRESSURE: 64 MMHG | HEIGHT: 70 IN | SYSTOLIC BLOOD PRESSURE: 110 MMHG | OXYGEN SATURATION: 96 % | HEART RATE: 107 BPM

## 2024-07-19 DIAGNOSIS — I10 ESSENTIAL HYPERTENSION: ICD-10-CM

## 2024-07-19 DIAGNOSIS — E11.65 TYPE 2 DIABETES MELLITUS WITH HYPERGLYCEMIA, WITH LONG-TERM CURRENT USE OF INSULIN: Primary | ICD-10-CM

## 2024-07-19 DIAGNOSIS — E78.49 OTHER HYPERLIPIDEMIA: ICD-10-CM

## 2024-07-19 DIAGNOSIS — Z79.4 TYPE 2 DIABETES MELLITUS WITH HYPERGLYCEMIA, WITH LONG-TERM CURRENT USE OF INSULIN: Primary | ICD-10-CM

## 2024-07-19 DIAGNOSIS — E55.9 VITAMIN D DEFICIENCY: ICD-10-CM

## 2024-07-19 RX ORDER — INSULIN GLARGINE 300 U/ML
80 INJECTION, SOLUTION SUBCUTANEOUS DAILY
Start: 2024-07-19

## 2024-07-19 NOTE — PROGRESS NOTES
Chief Complaint  Chief Complaint   Patient presents with    Diabetes       Subjective          History of Present Illness    Gonzalo Mcduffie 48 y.o. presents for a follow-up evaluation for Type 2 Diabetes     He has been diabetic since 2002 and started insulin in 2016     Pt is on the following medications for their DM: Toujeo 74 units every morning, Ozempic 2 mg weekly and Xigduo XR 5/1,000 mg 2 tablets daily         Denies diarrhea, constipation, chest pain, shortness of breath, vision changes or numbness and tingling in feet/hands.     Pt denies a history of diabetic retinopathy.  Last eye exam was 01/24     Pt does have a history of nephropathy.  Patient is currently taking ARB     Pt does have neuropathy.     Pt denies a history of CAD or CVA.     Last A1C in 05/24 was 8.6     Last microalbumin in 12/23 was positive          Blood Sugars    Blood sugars are checked via Clifton    Sensor Data    Time in range 39%  High 45%  Very high 16%  Low 0%  Very low 0%      Average Glucose - 197 mg/dL      Sensor Wear - 61 %              Hyperlipidemia      Pt is currently taking atorvastatin 80 mg HS      Last lipid panel in 05/24 showed Total 153, HDL 42, LDL 94 and Triglycerides 89                 Hypertension     Pt denies any chest pain, palpitations, shortness of breath or headache     Current regimen includes losartan 25 mg daily                  Vitamin D Deficiency     Current treatment includes 4,000 units daily     Last Vit D level was 27.8 in 05/24                 Other History     He has history of kidney stones and had previous lithotripsies.  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     He had multiple colon polyps removed in July 2020 by Dr. Brito.  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    Objective   Vital Signs:   /64   Pulse 107   Temp 96.9 °F (36.1  "°C)   Ht 177.8 cm (70\")   Wt 77.6 kg (171 lb)   SpO2 96%   BMI 24.54 kg/m²       Physical Exam   Physical Exam  Constitutional:       General: He is not in acute distress.     Appearance: Normal appearance. He is not diaphoretic.   HENT:      Head: Normocephalic and atraumatic.   Eyes:      General:         Right eye: No discharge.         Left eye: No discharge.   Skin:     General: Skin is warm and dry.   Neurological:      Mental Status: He is alert.   Psychiatric:         Mood and Affect: Mood normal.         Behavior: Behavior normal.                    Results Review:   Hemoglobin A1C   Date Value Ref Range Status   05/31/2024 8.60 (H) 4.80 - 5.60 % Final     Total Cholesterol   Date Value Ref Range Status   05/31/2024 153 0 - 200 mg/dL Final     Triglycerides   Date Value Ref Range Status   05/31/2024 89 0 - 150 mg/dL Final     HDL Cholesterol   Date Value Ref Range Status   05/31/2024 42 40 - 60 mg/dL Final     LDL Cholesterol    Date Value Ref Range Status   05/31/2024 94 0 - 100 mg/dL Final     LDL Chol Calc (NIH)   Date Value Ref Range Status   12/19/2023 146 (H) 0 - 99 mg/dL Final     VLDL Cholesterol   Date Value Ref Range Status   05/31/2024 17 5 - 40 mg/dL Final     VLDL Cholesterol Javier   Date Value Ref Range Status   12/19/2023 29 5 - 40 mg/dL Final     LDL/HDL Ratio   Date Value Ref Range Status   05/31/2024 2.22  Final         Assessment and Plan {CC Problem List  Visit Diagnosis  ROS  Review (Popup)  Health Maintenance  Quality  BestPractice  Medications  SmartSets  SnapShot Encounters  Media :23  Diagnoses and all orders for this visit:    1. Type 2 diabetes mellitus with hyperglycemia, with long-term current use of insulin (Primary)  -     Insulin Glargine, 1 Unit Dial, (Toujeo SoloStar) 300 UNIT/ML solution pen-injector injection; Inject 80 Units under the skin into the appropriate area as directed Daily.    Increase Toujeo 80 units every morning  Continue with Ozempic 2 mg " weekly   Continue with Xigduo XR 5/1,000 mg 2 tablets daily   Continue with Clifton      2. Other hyperlipidemia    Continue with statin      3. Essential hypertension    Stable  Continue with current medication regimen  Defer management to PCP       4. Vitamin D deficiency    Continue with supplement          RTC on 10/24/24 with Dr. Coker      Follow Up     Patient was given instructions and counseling regarding her condition or for health maintenance advice. Please see specific information pulled into the AVS if appropriate.                Kori Keith, TIKI  07/19/24

## 2024-07-22 ENCOUNTER — TELEPHONE (OUTPATIENT)
Dept: ENDOCRINOLOGY | Age: 49
End: 2024-07-22
Payer: COMMERCIAL

## 2024-08-28 ENCOUNTER — PRIOR AUTHORIZATION (OUTPATIENT)
Dept: ENDOCRINOLOGY | Age: 49
End: 2024-08-28
Payer: COMMERCIAL

## 2024-08-28 DIAGNOSIS — Z79.4 TYPE 2 DIABETES MELLITUS WITH HYPERGLYCEMIA, WITH LONG-TERM CURRENT USE OF INSULIN: ICD-10-CM

## 2024-08-28 DIAGNOSIS — E11.65 TYPE 2 DIABETES MELLITUS WITH HYPERGLYCEMIA, WITH LONG-TERM CURRENT USE OF INSULIN: ICD-10-CM

## 2024-08-28 RX ORDER — INSULIN GLARGINE 300 U/ML
80 INJECTION, SOLUTION SUBCUTANEOUS DAILY
Qty: 24 ML | Refills: 1 | Status: SHIPPED | OUTPATIENT
Start: 2024-08-28

## 2024-08-28 RX ORDER — SEMAGLUTIDE 2.68 MG/ML
2 INJECTION, SOLUTION SUBCUTANEOUS WEEKLY
Qty: 9 ML | Refills: 1 | Status: SHIPPED | OUTPATIENT
Start: 2024-08-28

## 2024-08-28 NOTE — TELEPHONE ENCOUNTER
Rx Refill Note  Requested Prescriptions     Pending Prescriptions Disp Refills    Insulin Glargine, 1 Unit Dial, (Toujeo SoloStar) 300 UNIT/ML solution pen-injector injection [Pharmacy Med Name: TOUJEO SOLOSTAR 300 UNIT/ML] 18 mL 1     Sig: INJECT 60 UNITS UNDER THE SKIN INTO THE APPROPRIATE AREA AS DIRECTED EVERY MORNING. INDICATIONS: TYPE 2 DIABETES    Semaglutide, 2 MG/DOSE, (Ozempic, 2 MG/DOSE,) 8 MG/3ML solution pen-injector [Pharmacy Med Name: OZEMPIC 8 MG/3 ML (2 MG/DOSE)] 9 mL 1     Sig: INJECT 2 MG UNDER THE SKIN INTO THE APPROPRIATE AREA AS DIRECTED 1 (ONE) TIME PER WEEK.      Last office visit with prescribing clinician: Visit date not found   Last telemedicine visit with prescribing clinician: Visit date not found   Next office visit with prescribing clinician: Visit date not found                         Would you like a call back once the refill request has been completed: [] Yes [] No    If the office needs to give you a call back, can they leave a voicemail: [] Yes [] No    Rina Jarvis MA  08/28/24, 08:05 EDT

## 2024-08-28 NOTE — TELEPHONE ENCOUNTER
A PA for Ozempic (2 MG/DOSE) 8MG/3ML pen-injectors has been started.    (Key: [id number])  Rx #: [id number]

## 2024-08-29 NOTE — TELEPHONE ENCOUNTER
CaseId:[id number];Status:Approved;Review Type:Prior Auth;Coverage     Start Date:07/29/2024;Coverage End Date:08/28/2025;.     Authorization Expiration Date: August 28, 2025.

## 2024-10-08 ENCOUNTER — TELEPHONE (OUTPATIENT)
Dept: ENDOCRINOLOGY | Age: 49
End: 2024-10-08
Payer: COMMERCIAL

## 2024-10-08 RX ORDER — BLOOD-GLUCOSE SENSOR
EACH MISCELLANEOUS
Qty: 6 EACH | Refills: 1 | Status: SHIPPED | OUTPATIENT
Start: 2024-10-08

## 2025-02-13 ENCOUNTER — TELEPHONE (OUTPATIENT)
Dept: ENDOCRINOLOGY | Age: 50
End: 2025-02-13

## 2025-02-13 ENCOUNTER — OFFICE VISIT (OUTPATIENT)
Dept: ENDOCRINOLOGY | Age: 50
End: 2025-02-13
Payer: COMMERCIAL

## 2025-02-13 VITALS
HEIGHT: 70 IN | DIASTOLIC BLOOD PRESSURE: 72 MMHG | OXYGEN SATURATION: 98 % | WEIGHT: 182 LBS | TEMPERATURE: 98.6 F | HEART RATE: 88 BPM | BODY MASS INDEX: 26.05 KG/M2 | SYSTOLIC BLOOD PRESSURE: 118 MMHG

## 2025-02-13 DIAGNOSIS — E78.5 HYPERLIPIDEMIA, UNSPECIFIED HYPERLIPIDEMIA TYPE: ICD-10-CM

## 2025-02-13 DIAGNOSIS — R80.9 TYPE 2 DIABETES MELLITUS WITH MICROALBUMINURIA, WITH LONG-TERM CURRENT USE OF INSULIN: Primary | ICD-10-CM

## 2025-02-13 DIAGNOSIS — Z86.0100 HISTORY OF COLON POLYPS: ICD-10-CM

## 2025-02-13 DIAGNOSIS — E11.29 TYPE 2 DIABETES MELLITUS WITH MICROALBUMINURIA, WITH LONG-TERM CURRENT USE OF INSULIN: Primary | ICD-10-CM

## 2025-02-13 DIAGNOSIS — I10 ESSENTIAL HYPERTENSION: ICD-10-CM

## 2025-02-13 DIAGNOSIS — Z91.199 NONCOMPLIANCE WITH DIET AND MEDICATION REGIMEN: ICD-10-CM

## 2025-02-13 DIAGNOSIS — Z79.4 TYPE 2 DIABETES MELLITUS WITH MICROALBUMINURIA, WITH LONG-TERM CURRENT USE OF INSULIN: Primary | ICD-10-CM

## 2025-02-13 DIAGNOSIS — Z91.148 NONCOMPLIANCE WITH DIET AND MEDICATION REGIMEN: ICD-10-CM

## 2025-02-13 DIAGNOSIS — E55.9 VITAMIN D DEFICIENCY: ICD-10-CM

## 2025-02-13 PROCEDURE — 99214 OFFICE O/P EST MOD 30 MIN: CPT | Performed by: INTERNAL MEDICINE

## 2025-02-13 NOTE — TELEPHONE ENCOUNTER
12/13 called and lm for pt to call back so we can get his PCP phone #     ----- Message from Benton Coker sent at 2/13/2025 11:17 AM EST -----  Send copy of my note to Dr. Ramirez

## 2025-02-13 NOTE — PROGRESS NOTES
Subjective   Gonzalo Mcduffie is a 49 y.o. male.     History of Present Illness     He has known diabetes mellitus since 2002 and started on insulin in 2016. He is on Toujeo 80 units every morning, Ozempic 2 mg weekly, and Xigduo XR 5/1000 mg 2 tablets once a day.  He skips Xigduo intermittently.  He admits to dietary noncompliance and eats out often.  He uses a freestyle miguel angel 3 sensor but has not been using it regularly.  He restarted on the sensor yesterday..  He has gained 11 pounds since July 2024.  His last meal was last night.     Sensor data -only 1 day of data available  Time in range 98%     His last eye examination was in 1/25.  He denies retinopathy.  He denies numbness, tingling or burning in his hands or feet.  Urine microalbumin was elevated in December 2023.  He is on losartan.  He was seen by Dr. Funez in the past.     He has hyperlipidemia and is on Lipitor 80 mg/day.  He was skipping doses until 3 weeks ago.  He denies myalgia.      He has hypertension is on losartan 25 mg once a day.  He skips taking losartan intermittently.  He has no previous history of heart attack or stroke.  He denies chest pain or shortness of breath.       He has history of vitamin D deficiency and is on vitamin D3  2000 units 2 tablets daily.      He has history of kidney stones and had previous lithotripsies.  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     He had multiple colon polyps removed in July 2020 by Dr. Brito.  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  He denies bowel complaints.  His father had colon cancer in his 70s.       He had polyps removed by colonoscopy by Dr. Brito in May 2024.  Pathology showed fragments of tubulovillous adenoma and tubular adenoma.  He was advised repeat colonoscopy in 2027.  He denies bowel complaints.    He is single.  He denies alcohol or tobacco use.  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    Review of Systems   Eyes:  Negative for visual disturbance.   Respiratory:  Negative for shortness of breath and wheezing.    Cardiovascular:  Negative for chest pain and palpitations.   Gastrointestinal: Negative.    Genitourinary: Negative.  Negative for hematuria.   Musculoskeletal:  Negative for myalgias.   Neurological:  Negative for numbness.     Vitals:    02/13/25 1000   BP: 118/72   Pulse: 88   Temp: 98.6 °F (37 °C)   TempSrc: Oral   SpO2: 98%   Weight: 82.6 kg (182 lb)   Height: 177.8 cm (70\")      Objective   Physical Exam  Constitutional:       General: He is not in acute distress.     Appearance: Normal appearance. He is not ill-appearing, toxic-appearing or diaphoretic.   Eyes:      General: No scleral icterus.        Right eye: No discharge.         Left eye: No discharge.      Extraocular Movements: Extraocular movements intact.   Neck:      Vascular: No carotid bruit.   Cardiovascular:      Rate and Rhythm: Normal rate and regular rhythm.      Heart sounds: Normal heart sounds.   Pulmonary:      Breath sounds: No wheezing or rales.   Abdominal:      Palpations: Abdomen is soft.      Tenderness: There is no right CVA tenderness or left CVA tenderness.   Musculoskeletal:      Right lower leg: No edema.      Left lower leg: No edema.      Comments: Feet warm.  No cyanosis or clubbing.  Onychomycosis on all toes.  No plantar ulcers   Lymphadenopathy:      Cervical: No cervical adenopathy.   Neurological:      Mental Status: He is alert.      Comments: Intact light touch in lower extremities.       Lab Requisition on 05/28/2024   Component Date Value Ref Range Status    Case Report 05/28/2024    Final                    Value:Surgical Pathology Report                         Case: [id number]                            " "      Authorizing Provider:  Kishore Brito MD        Collected:           05/28/2024 01:12 PM          Ordering Location:     [facility]  Received:            05/28/2024 02:58 PM                                 [facility]                                                               Pathologist:           Buck Alvarez MD                                                          Specimens:   1) - Large Intestine, Left / Descending Colon, polyp                                                2) - Large Intestine, Rectum, polyp                                                        Final Diagnosis 05/28/2024    Final                    Value:1.  Colon, descending, biopsy:                           A.  Fragments of tubular adenoma.                                                    2.  Colon, rectum, biopsy:                           A.  Fragments of tubulovillous adenoma.    Gross Description 05/28/2024    Final                    Value:1. Large Intestine, Left / Descending Colon.                          The specimen is received in formalin labeled with the patient's name and                           further designated 'descending colon biopsy' are multiple small fragments                           of gray-tan tissue. The specimen is submitted for embedding as received.                                                    2. Large Intestine, Rectum.                           Received in formalin labeled with the patient's name and designated                           \"rectal polyp\" are 2 pink-tan irregular polypoid fragments measuring 0.3 x                           0.3 x 0.2 cm and 0.5 x 0.5 x 0.4 cm.  Both polyps are bisected.  All                           tissue is submitted in 2A.                                                    mb/[facility]                                                         Assessment & Plan   Diagnoses and all orders for this visit:    1. Type 2 diabetes mellitus " with microalbuminuria, with long-term current use of insulin (Primary)  -     Comprehensive Metabolic Panel  -     Hemoglobin A1c  -     Vitamin B12  -     Microalbumin / Creatinine Urine Ratio - Urine, Clean Catch  -     TSH Rfx On Abnormal To Free T4    2. Hyperlipidemia, unspecified hyperlipidemia type  -     TSH Rfx On Abnormal To Free T4  -     Lipid Panel    3. Essential hypertension    4. Vitamin D deficiency  -     Vitamin D,25-Hydroxy    5. History of colon polyps    6. Noncompliance with diet and medication regimen      Continue Toujeo 80 units every morning, Ozempic 2 mg weekly, and Xigduo XR 5/1000 mg 2 tablets daily.  Discussed with patient about the need for taking his medications regularly.    Continue Lipitor 80 mg/day.    Continue losartan 25 mg/day.    Follow-up with Dr. Bergman and Dr. Brito as scheduled.    Advised patient to see podiatrist.    Copy of my note sent to Dr. Ramirez and Dr. Bergman.    RTC 4 mos with DAVIDE Keith NP

## 2025-02-14 LAB
25(OH)D3+25(OH)D2 SERPL-MCNC: 27.7 NG/ML (ref 30–100)
ALBUMIN SERPL-MCNC: 4.8 G/DL (ref 3.5–5.2)
ALBUMIN/CREAT UR: 33 MG/G CREAT (ref 0–29)
ALBUMIN/GLOB SERPL: 1.4 G/DL
ALP SERPL-CCNC: 87 U/L (ref 39–117)
ALT SERPL-CCNC: 55 U/L (ref 1–41)
AST SERPL-CCNC: 30 U/L (ref 1–40)
BILIRUB SERPL-MCNC: 0.8 MG/DL (ref 0–1.2)
BUN SERPL-MCNC: 15 MG/DL (ref 6–20)
BUN/CREAT SERPL: 14.2 (ref 7–25)
CALCIUM SERPL-MCNC: 11 MG/DL (ref 8.6–10.5)
CHLORIDE SERPL-SCNC: 102 MMOL/L (ref 98–107)
CHOLEST SERPL-MCNC: 215 MG/DL (ref 0–200)
CO2 SERPL-SCNC: 26.6 MMOL/L (ref 22–29)
CREAT SERPL-MCNC: 1.06 MG/DL (ref 0.76–1.27)
CREAT UR-MCNC: 79.6 MG/DL
EGFRCR SERPLBLD CKD-EPI 2021: 85.5 ML/MIN/1.73
GLOBULIN SER CALC-MCNC: 3.4 GM/DL
GLUCOSE SERPL-MCNC: 124 MG/DL (ref 65–99)
HBA1C MFR BLD: 9.6 % (ref 4.8–5.6)
HDLC SERPL-MCNC: 39 MG/DL (ref 40–60)
IMP & REVIEW OF LAB RESULTS: NORMAL
LDLC SERPL CALC-MCNC: 154 MG/DL (ref 0–100)
MICROALBUMIN UR-MCNC: 26.2 UG/ML
POTASSIUM SERPL-SCNC: 4.6 MMOL/L (ref 3.5–5.2)
PROT SERPL-MCNC: 8.2 G/DL (ref 6–8.5)
SODIUM SERPL-SCNC: 143 MMOL/L (ref 136–145)
TRIGL SERPL-MCNC: 120 MG/DL (ref 0–150)
TSH SERPL DL<=0.005 MIU/L-ACNC: 0.8 UIU/ML (ref 0.27–4.2)
VIT B12 SERPL-MCNC: 448 PG/ML (ref 211–946)
VLDLC SERPL CALC-MCNC: 22 MG/DL (ref 5–40)

## 2025-02-16 DIAGNOSIS — E83.52 HYPERCALCEMIA: Primary | ICD-10-CM

## 2025-02-17 NOTE — PROGRESS NOTES
25 hydroxy vitamin D 27.7 ng/mL.  On vitamin D3 2000 units 2 tablets daily.  Calcium elevated at 11.0 mg per DL.  Stay well-hydrated.  Repeat CMP, intact PTH, phosphorus and PTH RP 2 to 3 weeks.  Orders placed on chart.  Hemoglobin A1c higher at 9.6%.  Diabetes is poorly controlled.  Sensor download in 3 weeks.  Urine microalbumin elevated.  Take losartan regularly.  LDL higher at 154.  HDL 39.  Cholesterol 215.  Triglycerides 120.  Patient has been skipping multiple doses of Lipitor 80 mg.  Take Lipitor 80 mg/day regularly.  Normal thyroid function test.  Normal vitamin B12.  Send copy of labs to Dr. Ramirez.  Copy of labs sent to Dr. Bergman.  Please notify patient of results and instructions.

## 2025-02-20 DIAGNOSIS — E11.65 TYPE 2 DIABETES MELLITUS WITH HYPERGLYCEMIA: ICD-10-CM

## 2025-02-20 RX ORDER — ACYCLOVIR 800 MG/1
TABLET ORAL
Qty: 6 EACH | Refills: 2 | Status: SHIPPED | OUTPATIENT
Start: 2025-02-20

## 2025-02-20 NOTE — TELEPHONE ENCOUNTER
Rx Refill Note  Requested Prescriptions     Pending Prescriptions Disp Refills    Continuous Glucose Sensor (FreeStyle Clifton 3 Sensor) misc [Pharmacy Med Name: FREESTYLE CLIFTON 3 SENSOR] 6 each 1     Sig: USE 1 EACH EVERY 14 (FOURTEEN) DAYS. INDICATIONS: TYPE 2 DIABETES      Last office visit with prescribing clinician: 7/19/2024   Last telemedicine visit with prescribing clinician: Visit date not found   Next office visit with prescribing clinician: 6/13/2025                         Would you like a call back once the refill request has been completed: [] Yes [] No    If the office needs to give you a call back, can they leave a voicemail: [] Yes [] No    Chiquis Jarvis  02/20/25, 11:14 EST

## 2025-03-04 ENCOUNTER — LAB (OUTPATIENT)
Dept: ENDOCRINOLOGY | Age: 50
End: 2025-03-04
Payer: COMMERCIAL

## 2025-03-04 DIAGNOSIS — E83.52 HYPERCALCEMIA: ICD-10-CM

## 2025-03-05 LAB
ALBUMIN SERPL-MCNC: NORMAL G/DL
ALP SERPL-CCNC: NORMAL U/L
ALT SERPL-CCNC: NORMAL U/L
AST SERPL-CCNC: NORMAL U/L
BILIRUB SERPL-MCNC: NORMAL MG/DL
BUN SERPL-MCNC: NORMAL MG/DL
CALCIUM SERPL-MCNC: NORMAL MG/DL
CHLORIDE SERPL-SCNC: NORMAL MMOL/L
CO2 SERPL-SCNC: NORMAL MMOL/L
CREAT SERPL-MCNC: NORMAL MG/DL
GLUCOSE SERPL-MCNC: NORMAL MG/DL
PHOSPHATE SERPL-MCNC: NORMAL MG/DL
POTASSIUM SERPL-SCNC: NORMAL MMOL/L
PROT SERPL-MCNC: NORMAL G/DL
PTH RELATED PROT SERPL-SCNC: NORMAL PMOL/L
PTH-INTACT SERPL-MCNC: 23 PG/ML (ref 15–65)
REQUEST PROBLEM: NORMAL
REQUEST PROBLEM: NORMAL
SODIUM SERPL-SCNC: NORMAL MMOL/L

## 2025-03-06 DIAGNOSIS — E83.52 HYPERCALCEMIA: Primary | ICD-10-CM

## 2025-03-06 NOTE — PROGRESS NOTES
Intact PTH normal at 23 pg/mL.  PTH RP and phosphorus were canceled due to improper collection.  CMP and phosphorus canceled due to improper collection.    Repeat CMP, ionized calcium, phosphorus, PTH RP, and 1, 25 dihydroxy vitamin D.  Orders placed on chart.  May have labs done at [facility] lab downstairs.  Stay well-hydrated.  Xigduo increases water loss in urine.  Send copy of labs to Dr. Ramirez.  Please notify patient of results and instructions.

## 2025-03-13 LAB
ALBUMIN SERPL-MCNC: 4.7 G/DL (ref 4.1–5.1)
ALP SERPL-CCNC: 93 IU/L (ref 44–121)
ALT SERPL-CCNC: 44 IU/L (ref 0–44)
AST SERPL-CCNC: 29 IU/L (ref 0–40)
BILIRUB SERPL-MCNC: 0.8 MG/DL (ref 0–1.2)
BUN SERPL-MCNC: 10 MG/DL (ref 6–24)
BUN/CREAT SERPL: 9 (ref 9–20)
CALCIUM SERPL-MCNC: 10.3 MG/DL (ref 8.7–10.2)
CHLORIDE SERPL-SCNC: 104 MMOL/L (ref 96–106)
CO2 SERPL-SCNC: 21 MMOL/L (ref 20–29)
CREAT SERPL-MCNC: 1.08 MG/DL (ref 0.76–1.27)
EGFRCR SERPLBLD CKD-EPI 2021: 84 ML/MIN/1.73
GLOBULIN SER CALC-MCNC: 3.2 G/DL (ref 1.5–4.5)
GLUCOSE SERPL-MCNC: 102 MG/DL (ref 70–99)
PHOSPHATE SERPL-MCNC: 3.8 MG/DL (ref 2.8–4.1)
POTASSIUM SERPL-SCNC: 4.4 MMOL/L (ref 3.5–5.2)
PROT SERPL-MCNC: 7.9 G/DL (ref 6–8.5)
PTH RELATED PROT SERPL-SCNC: <2 PMOL/L
PTH-INTACT SERPL-MCNC: 48 PG/ML (ref 15–65)
SODIUM SERPL-SCNC: 146 MMOL/L (ref 134–144)

## 2025-03-21 ENCOUNTER — LAB (OUTPATIENT)
Dept: ENDOCRINOLOGY | Age: 50
End: 2025-03-21
Payer: COMMERCIAL

## 2025-03-21 DIAGNOSIS — E83.52 HYPERCALCEMIA: ICD-10-CM

## 2025-03-31 LAB
1,25(OH)2D SERPL-MCNC: 53 PG/ML (ref 24.8–81.5)
ALBUMIN SERPL ELPH-MCNC: 4.1 G/DL (ref 2.9–4.4)
ALBUMIN SERPL-MCNC: 4.7 G/DL (ref 4.1–5.1)
ALBUMIN/GLOB SERPL: 1.2 {RATIO} (ref 0.7–1.7)
ALP SERPL-CCNC: 94 IU/L (ref 44–121)
ALPHA1 GLOB SERPL ELPH-MCNC: 0.3 G/DL (ref 0–0.4)
ALPHA2 GLOB SERPL ELPH-MCNC: 0.9 G/DL (ref 0.4–1)
ALT SERPL-CCNC: 34 IU/L (ref 0–44)
AST SERPL-CCNC: 19 IU/L (ref 0–40)
B-GLOBULIN SERPL ELPH-MCNC: 1.3 G/DL (ref 0.7–1.3)
BILIRUB SERPL-MCNC: 0.7 MG/DL (ref 0–1.2)
BUN SERPL-MCNC: 16 MG/DL (ref 6–24)
BUN/CREAT SERPL: 18 (ref 9–20)
CA-I SERPL ISE-MCNC: 5.2 MG/DL (ref 4.5–5.6)
CALCIUM SERPL-MCNC: 10.5 MG/DL (ref 8.7–10.2)
CHLORIDE SERPL-SCNC: 101 MMOL/L (ref 96–106)
CO2 SERPL-SCNC: 25 MMOL/L (ref 20–29)
CREAT SERPL-MCNC: 0.9 MG/DL (ref 0.76–1.27)
EGFRCR SERPLBLD CKD-EPI 2021: 104 ML/MIN/1.73
GAMMA GLOB SERPL ELPH-MCNC: 1 G/DL (ref 0.4–1.8)
GLOBULIN SER CALC-MCNC: 2.9 G/DL (ref 1.5–4.5)
GLOBULIN SER-MCNC: 3.5 G/DL (ref 2.2–3.9)
GLUCOSE SERPL-MCNC: 100 MG/DL (ref 70–99)
IGA SERPL-MCNC: 458 MG/DL (ref 90–386)
IGG SERPL-MCNC: 1116 MG/DL (ref 603–1613)
IGM SERPL-MCNC: 83 MG/DL (ref 20–172)
INTERPRETATION SERPL IEP-IMP: ABNORMAL
LABORATORY COMMENT REPORT: ABNORMAL
M PROTEIN SERPL ELPH-MCNC: ABNORMAL G/DL
PHOSPHATE SERPL-MCNC: 3.5 MG/DL (ref 2.8–4.1)
POTASSIUM SERPL-SCNC: 4.1 MMOL/L (ref 3.5–5.2)
PROT SERPL-MCNC: 7.6 G/DL (ref 6–8.5)
PTH RELATED PROT SERPL-SCNC: <2 PMOL/L
SODIUM SERPL-SCNC: 144 MMOL/L (ref 134–144)

## 2025-04-26 DIAGNOSIS — Z79.4 TYPE 2 DIABETES MELLITUS WITH HYPERGLYCEMIA, WITH LONG-TERM CURRENT USE OF INSULIN: ICD-10-CM

## 2025-04-26 DIAGNOSIS — E11.65 TYPE 2 DIABETES MELLITUS WITH HYPERGLYCEMIA, WITH LONG-TERM CURRENT USE OF INSULIN: ICD-10-CM

## 2025-04-28 RX ORDER — PEN NEEDLE, DIABETIC 31 GX5/16"
1 NEEDLE, DISPOSABLE MISCELLANEOUS DAILY
Qty: 100 EACH | Refills: 3 | Status: SHIPPED | OUTPATIENT
Start: 2025-04-28

## 2025-04-28 RX ORDER — SEMAGLUTIDE 2.68 MG/ML
2 INJECTION, SOLUTION SUBCUTANEOUS WEEKLY
Qty: 9 ML | Refills: 1 | Status: SHIPPED | OUTPATIENT
Start: 2025-04-28

## 2025-04-28 NOTE — TELEPHONE ENCOUNTER
Rx Refill Note  Requested Prescriptions     Pending Prescriptions Disp Refills    B-D ULTRAFINE III SHORT PEN 31G X 8 MM misc [Pharmacy Med Name: BD UF SHORT PEN NEEDLE [id number]] 100 each 3     Si EACH BY OTHER ROUTE DAILY.      Last office visit with prescribing clinician: 2025   Last telemedicine visit with prescribing clinician: Visit date not found   Next office visit with prescribing clinician: Visit date not found                         Would you like a call back once the refill request has been completed: [] Yes [] No    If the office needs to give you a call back, can they leave a voicemail: [] Yes [] No    Rina Jarvis MA  25, 07:50 EDT

## 2025-04-28 NOTE — TELEPHONE ENCOUNTER
Rx Refill Note  Requested Prescriptions     Pending Prescriptions Disp Refills    Semaglutide, 2 MG/DOSE, (Ozempic, 2 MG/DOSE,) 8 MG/3ML solution pen-injector [Pharmacy Med Name: OZEMPIC 8 MG/3 ML (2 MG/DOSE)] 9 mL 1     Sig: INJECT 2 MG UNDER THE SKIN INTO THE APPROPRIATE AREA AS DIRECTED 1 (ONE) TIME PER WEEK.      Last office visit with prescribing clinician: 7/19/2024   Last telemedicine visit with prescribing clinician: Visit date not found   Next office visit with prescribing clinician: 6/13/2025                         Would you like a call back once the refill request has been completed: [] Yes [] No    If the office needs to give you a call back, can they leave a voicemail: [] Yes [] No    Chiquis Jarvis  04/28/25, 08:36 EDT

## 2025-06-13 ENCOUNTER — PRIOR AUTHORIZATION (OUTPATIENT)
Dept: ENDOCRINOLOGY | Age: 50
End: 2025-06-13
Payer: COMMERCIAL

## 2025-06-13 ENCOUNTER — OFFICE VISIT (OUTPATIENT)
Dept: ENDOCRINOLOGY | Age: 50
End: 2025-06-13
Payer: COMMERCIAL

## 2025-06-13 VITALS
DIASTOLIC BLOOD PRESSURE: 64 MMHG | HEART RATE: 103 BPM | TEMPERATURE: 98.3 F | SYSTOLIC BLOOD PRESSURE: 116 MMHG | BODY MASS INDEX: 25.6 KG/M2 | OXYGEN SATURATION: 97 % | WEIGHT: 178.8 LBS | HEIGHT: 70 IN

## 2025-06-13 DIAGNOSIS — Z79.4 TYPE 2 DIABETES MELLITUS WITH HYPERGLYCEMIA, WITH LONG-TERM CURRENT USE OF INSULIN: Primary | ICD-10-CM

## 2025-06-13 DIAGNOSIS — E11.65 TYPE 2 DIABETES MELLITUS WITH HYPERGLYCEMIA, WITH LONG-TERM CURRENT USE OF INSULIN: Primary | ICD-10-CM

## 2025-06-13 DIAGNOSIS — E78.5 HYPERLIPIDEMIA, UNSPECIFIED HYPERLIPIDEMIA TYPE: ICD-10-CM

## 2025-06-13 DIAGNOSIS — I10 ESSENTIAL HYPERTENSION: ICD-10-CM

## 2025-06-13 DIAGNOSIS — E55.9 VITAMIN D DEFICIENCY: ICD-10-CM

## 2025-06-13 DIAGNOSIS — E83.52 HYPERCALCEMIA: ICD-10-CM

## 2025-06-13 RX ORDER — TIRZEPATIDE 2.5 MG/.5ML
2.5 INJECTION, SOLUTION SUBCUTANEOUS WEEKLY
Qty: 2 ML | Refills: 0 | Status: SHIPPED | OUTPATIENT
Start: 2025-06-13

## 2025-06-13 RX ORDER — INSULIN GLARGINE 300 U/ML
86 INJECTION, SOLUTION SUBCUTANEOUS DAILY
Start: 2025-06-13

## 2025-06-13 RX ORDER — TIRZEPATIDE 5 MG/.5ML
5 INJECTION, SOLUTION SUBCUTANEOUS WEEKLY
Qty: 6 ML | Refills: 1 | Status: SHIPPED | OUTPATIENT
Start: 2025-06-13

## 2025-06-13 RX ORDER — HYDROCHLOROTHIAZIDE 12.5 MG/1
1 CAPSULE ORAL
Qty: 6 EACH | Refills: 1 | Status: SHIPPED | OUTPATIENT
Start: 2025-06-13

## 2025-06-13 NOTE — PROGRESS NOTES
Chief Complaint  Chief Complaint   Patient presents with    Diabetes     Type 2: Pt clifton is attached, is up to date on eye exam, no hx of retinopathy, mild neuropathy.  Pt is needing new rx sent in for the clifton 3 plus.        Subjective        History of Present Illness    Gonzalo Mcduffie 49 y.o. presents for a follow-up for Type 2 Diabetes    He was diagnosed with diabetes in 2002 and started insulin in 2016    Pt is on the following medications for their diabetes: Toujeo 80 units every morning, Ozempic 2 mg weekly and Xigduo XR 5/1,000 mg 2 tablets daily    Missing Xigduo a couple of doses a week         Denies nausea, diarrhea, constipation, chest pain, shortness of breath, vision changes or numbness and tingling in feet/hands.    Pt denies a history of diabetic retinopathy.  Last eye exam was 01/25     Pt does have a history of nephropathy.  Patient is currently taking ARB     Pt does have neuropathy.     Pt denies a history of CAD or CVA.    Last A1C in 02/25 was 9.60    Last microalbumin in 02/25 was positive           Blood Sugars    Blood glucoses are checked via Clifton    Fasting blood glucoses: high 100s    Pre-meal blood glucoses: mid 100s- 200s    Pt denies episodes of hypoglycemia.          Sensor Data    Time in range 10%  High 59%  Very high 31%  Low 0%  Very low 0%      Average Glucose - 234 mg/dL      Sensor Wear - 97 %            Hyperlipidemia     Pt is currently taking atorvastatin 80 mg HS     Last lipid panel in 02/25 showed Total 215, HDL 39,  and Triglycerides 120            Hypertension    Pt denies any chest pain, palpitations, shortness of breath or headache    Current regimen includes losartan 25 mg daily                  Vitamin D Deficiency     Current treatment includes 4,000 units daily     Last Vit D level was 27.7 in 02/25                 Other History     He has history of kidney stones and had previous lithotripsies.  He had surgery for urethral cancer done by   "Pacheco.     He had multiple colon polyps removed in July 2020 by Dr. Brito.  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     Appearance: Normal appearance. He is not diaphoretic.   HENT:      Head: Normocephalic and atraumatic.   Eyes:      General:         Right eye: No discharge.         Left eye: No discharge.   Skin:     General: Skin is warm and dry.   Neurological:      Mental Status: He is alert.   Psychiatric:         Mood and Affect: Mood normal.         Behavior: Behavior normal.                    Results Review:   Hemoglobin A1C   Date Value Ref Range Status   02/13/2025 9.60 (H) 4.80 - 5.60 % Final     Comment:     Hemoglobin A1C Ranges:  Increased Risk for Diabetes  5.7% to 6.4%  Diabetes                     >= 6.5%  Diabetic Goal                < 7.0%     05/31/2024 8.60 (H) 4.80 - 5.60 % Final     Total Cholesterol   Date Value Ref Range Status   05/31/2024 153 0 - 200 mg/dL Final     Triglycerides   Date Value Ref Range Status   02/13/2025 120 0 - 150 mg/dL Final   05/31/2024 89 0 - 150 mg/dL Final     HDL Cholesterol   Date Value Ref Range Status   02/13/2025 39 (L) 40 - 60 mg/dL Final   05/31/2024 42 40 - 60 mg/dL Final     LDL Chol Calc (NIH)   Date Value Ref Range Status   02/13/2025 154 (H) 0 - 100 mg/dL Final     VLDL Cholesterol Javier   Date Value Ref Range Status   02/13/2025 22 5 - 40 mg/dL Final     LDL/HDL Ratio   Date Value Ref Range Status   05/31/2024 2.22  Final         Assessment and Plan  Diagnoses and all orders for this visit:    1. Type 2 diabetes mellitus with hyperglycemia, with long-term current use of insulin (Primary)  -     Hemoglobin A1c  -     Basic Metabolic Panel  -     Continuous " Glucose Sensor (FreeStyle Clifton 3 Plus Sensor); Use 1 each Every 15 (Fifteen) Days. 1 each every 15 days  Dispense: 6 each; Refill: 1  -     Insulin Glargine, 1 Unit Dial, (Toujeo SoloStar) 300 UNIT/ML solution pen-injector injection; Inject 86 Units under the skin into the appropriate area as directed Daily.  -     Tirzepatide (Mounjaro) 2.5 MG/0.5ML solution auto-injector; Inject 2.5 mg under the skin into the appropriate area as directed 1 (One) Time Per Week. Inject 2.5 mg once a week for 4 weeks then increase to 5 mg weekly  Dispense: 2 mL; Refill: 0  -     Tirzepatide (Mounjaro) 5 MG/0.5ML solution auto-injector; Inject 5 mg under the skin into the appropriate area as directed 1 (One) Time Per Week.  Dispense: 6 mL; Refill: 1    Overall blood sugars are running high  Pt states that he has gotten better be still misses a couple of doses of Xigduo a week  Increase Toujeo 86 units every morning  Change Ozempic to Mounjaro 2.5 mg once a week for 4 weeks then increase to 5 mg weekly  Continue with Xigduo XR 5/1,000 mg 2 tablets daily - please take consistently  Continue with Clifton - change to Libe 3 plus  Check labs today      2. Hyperlipidemia, unspecified hyperlipidemia type  -     Basic Metabolic Panel    Continue with statin      3. Essential hypertension  -     Basic Metabolic Panel    Stable  Continue with current medication regimen  Defer management to PCP       4. Vitamin D deficiency  -     Basic Metabolic Panel  -     Vitamin D,25-Hydroxy    Check labs today  Continue with supplement        5. Hypercalcemia  -     Basic Metabolic Panel  -     Vitamin D,25-Hydroxy  -     PTH, Intact    Recheck labs today             No refills needed at this time      Labs today  RTC in 4 months with Dr. Coker      Follow Up    Patient was given instructions and counseling regarding her condition or for health maintenance advice. Please see specific information pulled into the AVS if appropriate.                Kori  TIKI Keith  06/13/25

## 2025-06-13 NOTE — TELEPHONE ENCOUNTER
Prior Authorization       Prior Authorization for Mounjaro was Approved    Approval Start Date: 05-  Approval End Date: 06-  Authorization / Reference / Case Number: [id number]    [facility] Key: [id number]

## 2025-06-13 NOTE — PATIENT INSTRUCTIONS
Increase Toujeo 86 units every morning  Change Ozempic to Mounjaro 2.5 mg once a week for 4 weeks then increase to 5 mg weekly  Continue with Xigduo XR 5/1,000 mg 2 tablets daily - please take consistently

## 2025-06-15 LAB
25(OH)D3+25(OH)D2 SERPL-MCNC: 28.6 NG/ML (ref 30–100)
BUN SERPL-MCNC: 15 MG/DL (ref 6–24)
BUN/CREAT SERPL: 16 (ref 9–20)
CALCIUM SERPL-MCNC: 10.6 MG/DL (ref 8.7–10.2)
CHLORIDE SERPL-SCNC: 106 MMOL/L (ref 96–106)
CO2 SERPL-SCNC: 19 MMOL/L (ref 20–29)
CREAT SERPL-MCNC: 0.92 MG/DL (ref 0.76–1.27)
EGFRCR SERPLBLD CKD-EPI 2021: 101 ML/MIN/1.73
GLUCOSE SERPL-MCNC: 62 MG/DL (ref 70–99)
HBA1C MFR BLD: 9 % (ref 4.8–5.6)
POTASSIUM SERPL-SCNC: 3.9 MMOL/L (ref 3.5–5.2)
PTH-INTACT SERPL-MCNC: 23 PG/ML (ref 15–65)
SODIUM SERPL-SCNC: 144 MMOL/L (ref 134–144)

## (undated) DEVICE — SNAR POLYP SENSATION STDOVL 27 240 BX40

## (undated) DEVICE — ADAPT CLN BIOGUARD AIR/H2O DISP

## (undated) DEVICE — TUR/ENDOSCOPIC CABLE, 10' (3.05 M): Brand: CONMED

## (undated) DEVICE — SENSR O2 OXIMAX FNGR A/ 18IN NONSTR

## (undated) DEVICE — TUBING, SUCTION, 1/4" X 20', STRAIGHT: Brand: MEDLINE INDUSTRIES, INC.

## (undated) DEVICE — TUBING, SUCTION, 1/4" X 10', STRAIGHT: Brand: MEDLINE

## (undated) DEVICE — PAD GRND REM POLYHESIVE A/ DISP

## (undated) DEVICE — TIDISHIELD UROLOGY DRAIN BAGS FROSTY VINYL STERILE FITS SIEMENS UROSKOP ACCESS 20 PER CASE: Brand: TIDISHIELD

## (undated) DEVICE — LN SMPL CO2 SHTRM SD STREAM W/M LUER

## (undated) DEVICE — THE TORRENT IRRIGATION SCOPE CONNECTOR IS USED WITH THE TORRENT IRRIGATION TUBING TO PROVIDE IRRIGATION FLUIDS SUCH AS STERILE WATER DURING GASTROINTESTINAL ENDOSCOPIC PROCEDURES WHEN USED IN CONJUNCTION WITH AN IRRIGATION PUMP (OR ELECTROSURGICAL UNIT).: Brand: TORRENT

## (undated) DEVICE — MARKR SKIN W/RULR AND LBL

## (undated) DEVICE — CANN O2 ETCO2 FITS ALL CONN CO2 SMPL A/ 7IN DISP LF

## (undated) DEVICE — GLV SURG SENSICARE MICRO PF LF 7.5 STRL

## (undated) DEVICE — THE SINGLE USE ETRAP – POLYP TRAP IS USED FOR SUCTION RETRIEVAL OF ENDOSCOPICALLY REMOVED POLYPS.: Brand: ETRAP

## (undated) DEVICE — KT ORCA ORCAPOD DISP STRL

## (undated) DEVICE — DRSNG TELFA PAD NONADH STR 1S 3X4IN

## (undated) DEVICE — DRAPE,UNDERBUTTOCKS,PCH,STERILE: Brand: MEDLINE

## (undated) DEVICE — METER,URINE,400ML,DRAIN BAG,L/F,LL,SLIDE: Brand: MEDLINE

## (undated) DEVICE — LOU CYSTO: Brand: MEDLINE INDUSTRIES, INC.

## (undated) DEVICE — CATHETER,FOLEY,100%SILICONE,18FR,10ML,LF: Brand: MEDLINE